# Patient Record
Sex: MALE | Race: WHITE | NOT HISPANIC OR LATINO | Employment: FULL TIME | URBAN - METROPOLITAN AREA
[De-identification: names, ages, dates, MRNs, and addresses within clinical notes are randomized per-mention and may not be internally consistent; named-entity substitution may affect disease eponyms.]

---

## 2017-02-11 ENCOUNTER — ALLSCRIPTS OFFICE VISIT (OUTPATIENT)
Dept: OTHER | Facility: OTHER | Age: 22
End: 2017-02-11

## 2017-04-01 ENCOUNTER — ALLSCRIPTS OFFICE VISIT (OUTPATIENT)
Dept: OTHER | Facility: OTHER | Age: 22
End: 2017-04-01

## 2017-07-15 ENCOUNTER — ALLSCRIPTS OFFICE VISIT (OUTPATIENT)
Dept: OTHER | Facility: OTHER | Age: 22
End: 2017-07-15

## 2017-07-19 LAB — CULTURE RESULT (HISTORICAL): NORMAL

## 2017-07-20 ENCOUNTER — GENERIC CONVERSION - ENCOUNTER (OUTPATIENT)
Dept: OTHER | Facility: OTHER | Age: 22
End: 2017-07-20

## 2017-08-09 ENCOUNTER — GENERIC CONVERSION - ENCOUNTER (OUTPATIENT)
Dept: OTHER | Facility: OTHER | Age: 22
End: 2017-08-09

## 2017-08-10 ENCOUNTER — GENERIC CONVERSION - ENCOUNTER (OUTPATIENT)
Dept: OTHER | Facility: OTHER | Age: 22
End: 2017-08-10

## 2017-08-10 LAB
BUN SERPL-MCNC: 15 MG/DL (ref 6–20)
BUN/CREA RATIO (HISTORICAL): 14 (ref 9–20)
CALCIUM SERPL-MCNC: 9.3 MG/DL (ref 8.7–10.2)
CHLORIDE SERPL-SCNC: 99 MMOL/L (ref 96–106)
CO2 SERPL-SCNC: 26 MMOL/L (ref 18–29)
CREAT SERPL-MCNC: 1.09 MG/DL (ref 0.76–1.27)
EGFR AFRICAN AMERICAN (HISTORICAL): 111 ML/MIN/1.73
EGFR-AMERICAN CALC (HISTORICAL): 96 ML/MIN/1.73
ERYTHROCYTE SEDIMENTATION RATE (HISTORICAL): 2 MM/HR (ref 0–15)
GLUCOSE SERPL-MCNC: 91 MG/DL (ref 65–99)
POTASSIUM SERPL-SCNC: 4.5 MMOL/L (ref 3.5–5.2)
SODIUM SERPL-SCNC: 141 MMOL/L (ref 134–144)

## 2017-08-11 LAB — LYME IGG/IGM AB (HISTORICAL): <0.91 ISR (ref 0–0.9)

## 2017-08-12 ENCOUNTER — GENERIC CONVERSION - ENCOUNTER (OUTPATIENT)
Dept: OTHER | Facility: OTHER | Age: 22
End: 2017-08-12

## 2017-08-14 ENCOUNTER — GENERIC CONVERSION - ENCOUNTER (OUTPATIENT)
Dept: OTHER | Facility: OTHER | Age: 22
End: 2017-08-14

## 2017-08-15 ENCOUNTER — GENERIC CONVERSION - ENCOUNTER (OUTPATIENT)
Dept: OTHER | Facility: OTHER | Age: 22
End: 2017-08-15

## 2018-01-09 NOTE — RESULT NOTES
Verified Results  (1) BASIC METABOLIC PROFILE [id number] 08:06AM Tamia Mew     Test Name Result Flag Reference   Glucose, Serum 91 mg/dL  65-99   BUN 15 mg/dL  6-20   Creatinine, Serum 1 09 mg/dL  0 76-1 27   BUN/Creatinine Ratio 14  9-20   Sodium, Serum 141 mmol/L  134-144   Potassium, Serum 4 5 mmol/L  3 5-5 2   Chloride, Serum 99 mmol/L     Carbon Dioxide, Total 26 mmol/L  18-29   Calcium, Serum 9 3 mg/dL  8 7-10 2   eGFR If NonAfricn Am 96 mL/min/1 73  >59   eGFR If Africn Am 111 mL/min/1 73  >59

## 2018-01-13 VITALS
WEIGHT: 170 LBS | SYSTOLIC BLOOD PRESSURE: 118 MMHG | HEIGHT: 70 IN | BODY MASS INDEX: 24.34 KG/M2 | DIASTOLIC BLOOD PRESSURE: 74 MMHG | TEMPERATURE: 99.7 F

## 2018-01-14 VITALS
HEIGHT: 70 IN | WEIGHT: 170 LBS | SYSTOLIC BLOOD PRESSURE: 118 MMHG | DIASTOLIC BLOOD PRESSURE: 74 MMHG | BODY MASS INDEX: 24.34 KG/M2

## 2018-01-14 VITALS
HEIGHT: 70 IN | WEIGHT: 170 LBS | DIASTOLIC BLOOD PRESSURE: 64 MMHG | SYSTOLIC BLOOD PRESSURE: 108 MMHG | BODY MASS INDEX: 24.34 KG/M2

## 2018-01-16 NOTE — RESULT NOTES
Message   Recorded as Task   Date: 08/14/2017 05:14 PM, Created By: Juan Christensen   Task Name: Call Patient with results   Assigned To:  Johnnie Thao   Regarding Patient: María Hurst, Status: Active   CommentRaelene Morris - 14 Aug 2017 5:14 PM     Patient Phone: [phone]    this result is normal   Shereen Chong - 15 Aug 2017 8:17 AM     TASK EDITED  pt aware results        Signatures   Electronically signed by : Monique Ruby, ; Aug 15 2017  8:18AM EST                       (Author)

## 2018-01-16 NOTE — RESULT NOTES
Verified Results  (1) THROAT CULTURE (CULTURE, UPPER RESPIRATORY) [id number] 12:00AM [facility]     Test Name Result Flag Reference   CULTURE, THROAT      CULTURE, THROAT         MICRO NUMBER:      [id number]    TEST STATUS:       FINAL    SPECIMEN SOURCE:   FULL THROAT CX    SPECIMEN QUALITY:  ADEQUATE    RESULT:            No oropharyngeal pathogens recovered

## 2018-01-17 NOTE — PROGRESS NOTES
Assessment    1  Encounter for preventive health examination (V70 0) (Z00 00)   2  Dental infection (522 4) (K04 7)    Plan  Health Maintenance    · (1) BASIC METABOLIC PROFILE; Status:Active; Requested for:21Nov2016;    · (1) LIPID PANEL FASTING W DIRECT LDL REFLEX; Status:Active; Requested  for:21Nov2016;     Discussion/Summary  Impression: health maintenance visit  Currently, he eats a healthy diet and has an adequate exercise regimen  Prostate cancer screening: PSA is not indicated  Testicular cancer screening: the risks and benefits of testicular cancer screening were discussed and self testicular exam technique was taught  Colorectal cancer screening: colorectal cancer screening is not indicated  Screening lab work includes ordered  The risks and benefits of immunizations were discussed  He was advised to be evaluated by a dentist  Advice and education were given regarding nutrition, aerobic exercise and seat belt use  Patient discussion: discussed with the patient  Get dental exam-possible unroofed wisdom- If dentist feels needs oral surgeon  Check CMP, lipid panel  Possible side effects of new medications were reviewed with the patient/guardian today  Chief Complaint  ANNUAL PE    TROUBLE WITH WISDOM TEETH    History of Present Illness  HM, Adult Male: The patient is being seen for a health maintenance evaluation  The last health maintenance visit was >5 year(s) ago  Social History: Household members include father  He is unmarried  Work status: working part-time  The patient has never smoked cigarettes  He reports frequent alcohol use and drinking 1 drinks per day  The patient has no concerns about alcohol abuse  He has never used illicit drugs  General Health: The patient's health since the last visit is described as fair  He does not have regular dental visits  He denies vision problems  He denies hearing loss  Lifestyle:  He does not have a healthy diet   He does not have any weight concerns  He exercises regularly  He does not use tobacco    Screening: cancer screening reviewed and current  metabolic screening reviewed and updated  Additional History:  FH- father HTN    HPI: 1) dental pain and swelling of gums, dental sensitivity  2) no metabolic screening to date      Review of Systems    Constitutional: no fever, not feeling poorly, no chills and not feeling tired  Cardiovascular: the heart rate was not slow, no chest pain, the heart rate was not fast and no palpitations  Respiratory: no shortness of breath, no cough, no wheezing and no shortness of breath during exertion  Gastrointestinal: no abdominal pain, no nausea and no diarrhea  Genitourinary: no dysuria, no urinary hesitancy and no nocturia  Musculoskeletal: no arthralgias, no joint swelling, no myalgias and no joint stiffness  Psychiatric: no anxiety, no personality change, no depression and no emotional problems  Active Problems    1  Acute bronchitis, unspecified organism (466 0) (J20 9)    Family History  Mother    · Family history of High cholesterol  Father    · Family history of High cholesterol    Social History    · Current some day smoker (305 1) (F17 210)    Current Meds   1  No Reported Medications Recorded    Allergies    1  Amoxicillin CAPS    Vitals   Recorded: 21Nov2016 03:07PM   Heart Rate 80   Respiration 16   Systolic 314   Diastolic 70   Height 5 ft 10 in   Weight 156 lb    BMI Calculated 22 38   BSA Calculated 1 88   O2 Saturation 95     Physical Exam    Constitutional   General appearance: No acute distress, well appearing and well nourished  Head and Face   Head and face: Normal     Palpation of the face and sinuses: No sinus tenderness  Eyes   Conjunctiva and lids: No erythema, swelling or discharge  Pupils and irises: Equal, round, reactive to light  Ophthalmoscopic examination: Normal fundi and optic discs      Ears, Nose, Mouth, and Throat   External inspection of ears and nose: Normal     Otoscopic examination: Tympanic membranes translucent with normal light reflex  Canals patent without erythema  Nasal mucosa, septum, and turbinates: Normal without edema or erythema  Lips, teeth, and gums: Abnormal   Swelling posterior molars both sides, left greater than right  Some tenderness of the gums  Oropharynx: Normal with no erythema, edema, exudate or lesions  Neck   Neck: Supple, symmetric, trachea midline, no masses  Thyroid: Normal, no thyromegaly  Pulmonary   Respiratory effort: No increased work of breathing or signs of respiratory distress  Auscultation of lungs: Clear to auscultation  no rales or crackles were heard bilaterally  no rhonchi  no wheezing  Cardiovascular   Auscultation of heart: Normal rate and rhythm, normal S1 and S2, no murmurs  Carotid pulses: 2+ bilaterally  Pedal pulses: 2+ bilaterally  Examination of extremities for edema and/or varicosities: Normal     Abdomen   Abdomen: Non-tender, no masses  The abdomen was flat  Bowel sounds were normal  The abdomen was soft and nontender  The abdomen was normal to percussion  Musculoskeletal   Gait and station: Normal     Inspection/palpation of digits and nails: Normal without clubbing or cyanosis  Inspection/palpation of joints, bones, and muscles: Normal     Range of motion: Normal     Muscle strength/tone: Normal     Skin   Skin and subcutaneous tissue: Normal without rashes or lesions  Neurologic   Cranial nerves: Cranial nerves 2-12 intact  Psychiatric   Orientation to person, place and time: Normal     Mood and affect: Normal        Results/Data  PHQ-2 Adult Depression Screening 21Nov2016 03:48PM User, [employer]     Test Name Result Flag Reference   PHQ-2 Adult Depression Score 0     Over the last two weeks, how often have you been bothered by any of the following problems?   Little interest or pleasure in doing things: Not at all - 0  Feeling down, depressed, or hopeless: Not at all - 0   PHQ-2 Adult Depression Screening Negative         Signatures   Electronically signed by : Lanette Fong MD; Nov 21 2016  4:54PM EST                       (Author)

## 2018-01-18 NOTE — RESULT NOTES
Verified Results  (LC) Lyme, Total Ab Test/Reflex [id number] 08:09AM [email or web address]     Test Name Result Flag Reference   Lyme IgG/IgM Ab <0 91 ISR  0 00-0 90   Negative         <0 91                                                 Equivocal  0 91 - 1 09                                                 Positive         >1 09                       (LC) Sedimentation Rate-Westergren [id number] 08:09AM [email or web address]     Test Name Result Flag Reference   Sedimentation Rate-Westergren 2 mm/hr  0-15

## 2020-01-06 ENCOUNTER — OFFICE VISIT (OUTPATIENT)
Dept: FAMILY MEDICINE CLINIC | Facility: CLINIC | Age: 25
End: 2020-01-06
Payer: COMMERCIAL

## 2020-01-06 VITALS
HEART RATE: 70 BPM | OXYGEN SATURATION: 97 % | TEMPERATURE: 97.2 F | WEIGHT: 175.71 LBS | DIASTOLIC BLOOD PRESSURE: 80 MMHG | BODY MASS INDEX: 25.15 KG/M2 | HEIGHT: 70 IN | SYSTOLIC BLOOD PRESSURE: 122 MMHG

## 2020-01-06 DIAGNOSIS — R05.9 COUGH: ICD-10-CM

## 2020-01-06 DIAGNOSIS — J21.9 ACUTE BRONCHIOLITIS DUE TO UNSPECIFIED ORGANISM: Primary | ICD-10-CM

## 2020-01-06 PROCEDURE — 3008F BODY MASS INDEX DOCD: CPT | Performed by: NURSE PRACTITIONER

## 2020-01-06 PROCEDURE — 99213 OFFICE O/P EST LOW 20 MIN: CPT | Performed by: NURSE PRACTITIONER

## 2020-01-06 PROCEDURE — 1036F TOBACCO NON-USER: CPT | Performed by: NURSE PRACTITIONER

## 2020-01-06 RX ORDER — GUAIFENESIN AND CODEINE PHOSPHATE 100; 10 MG/5ML; MG/5ML
5 SOLUTION ORAL 3 TIMES DAILY PRN
Qty: 120 ML | Refills: 0 | Status: SHIPPED | OUTPATIENT
Start: 2020-01-06 | End: 2022-07-06

## 2020-01-06 RX ORDER — AZITHROMYCIN 250 MG/1
TABLET, FILM COATED ORAL
Qty: 6 TABLET | Refills: 0 | Status: CANCELLED | OUTPATIENT
Start: 2020-01-06 | End: 2020-01-11

## 2020-01-06 RX ORDER — AZITHROMYCIN 250 MG/1
TABLET, FILM COATED ORAL
Qty: 6 TABLET | Refills: 0 | Status: SHIPPED | OUTPATIENT
Start: 2020-01-06 | End: 2020-01-10

## 2020-01-06 NOTE — PROGRESS NOTES
BMI Counseling: Body mass index is 25 21 kg/m²  The BMI is above normal  Nutrition recommendations include decreasing portion sizes and consuming healthier snacks  Exercise recommendations include exercising 3-5 times per week  [address]  [facility]        NAME: Brett Damico is a 25 y o  male  : 1995    MRN: [id number]  DATE: 2020  TIME: 3:24 PM    Assessment and Plan   Acute bronchiolitis due to unspecified organism [J21 9]  1  Acute bronchiolitis due to unspecified organism  azithromycin (ZITHROMAX) 250 mg tablet   2  Cough  guaifenesin-codeine (GUAIFENESIN AC) 100-10 MG/5ML liquid         Patient Instructions     Patient Instructions   Discussed viral vs bacterial infection  RX as ordered  Continue OTC cough/cold medications as directed  Call or return for problems/concerns            Chief Complaint     Chief Complaint   Patient presents with    Cough     past 5 weeks         History of Present Illness       Cough x 5 weeks  Productive at times  Other symptoms include sore throat, chest tightness and congestion  No fever  Taking OTC cold medications with temporary relief  Review of Systems   Review of Systems   Constitutional: Negative for activity change, chills, fatigue and fever  HENT: Positive for congestion, postnasal drip, rhinorrhea and sore throat  Negative for ear pain and sinus pressure  Eyes: Negative for pain, discharge and redness  Respiratory: Positive for cough and chest tightness  Negative for wheezing  Cardiovascular: Negative for chest pain  Gastrointestinal: Negative for constipation, diarrhea, nausea and vomiting  Musculoskeletal: Negative for myalgias  Skin: Negative for rash  Neurological: Positive for headaches  Negative for dizziness           Current Medications       Current Outpatient Medications:     azithromycin (ZITHROMAX) 250 mg tablet, Take 2 tablets today then 1 tablet daily x 4 days, Disp: 6 tablet, Rfl: 0   guaifenesin-codeine (GUAIFENESIN AC) 100-10 MG/5ML liquid, Take 5 mL by mouth 3 (three) times a day as needed for cough, Disp: 120 mL, Rfl: 0    Current Allergies     Allergies as of 01/06/2020 - Reviewed 01/06/2020   Allergen Reaction Noted    Amoxicillin  06/09/2016            The following portions of the patient's history were reviewed and updated as appropriate: allergies, current medications, past family history, past medical history, past social history, past surgical history and problem list      History reviewed  No pertinent past medical history  Past Surgical History:   Procedure Laterality Date    WISDOM TOOTH EXTRACTION         History reviewed  No pertinent family history  Medications have been verified  Objective   /80 (BP Location: Left arm, Patient Position: Sitting, Cuff Size: Extra-Large)   Pulse 70   Temp (!) 97 2 °F (36 2 °C) (Tympanic)   Ht 5' 10" (1 778 m)   Wt 79 7 kg (175 lb 11 3 oz)   SpO2 97%   BMI 25 21 kg/m²        Physical Exam     Physical Exam   Constitutional: He is oriented to person, place, and time  He appears well-developed and well-nourished  HENT:   Head: Normocephalic  Nose: Rhinorrhea present  Mouth/Throat: Posterior oropharyngeal erythema present  Eyes: Pupils are equal, round, and reactive to light  Conjunctivae and EOM are normal    Neck: Normal range of motion  Neck supple  No thyromegaly present  Cardiovascular: Normal rate, regular rhythm and normal heart sounds  Pulmonary/Chest: Effort normal and breath sounds normal  No respiratory distress  He has no wheezes  He has no rales  Cough present   Lymphadenopathy:     He has no cervical adenopathy  Neurological: He is alert and oriented to person, place, and time  Skin: Skin is warm and dry  Psychiatric: He has a normal mood and affect  His behavior is normal    Vitals reviewed

## 2020-03-25 ENCOUNTER — OFFICE VISIT (OUTPATIENT)
Dept: FAMILY MEDICINE CLINIC | Facility: CLINIC | Age: 25
End: 2020-03-25
Payer: COMMERCIAL

## 2020-03-25 VITALS
BODY MASS INDEX: 25.77 KG/M2 | HEART RATE: 76 BPM | SYSTOLIC BLOOD PRESSURE: 122 MMHG | DIASTOLIC BLOOD PRESSURE: 70 MMHG | HEIGHT: 70 IN | WEIGHT: 180 LBS

## 2020-03-25 DIAGNOSIS — L40.9 PSORIASIS: Primary | ICD-10-CM

## 2020-03-25 PROCEDURE — 99213 OFFICE O/P EST LOW 20 MIN: CPT | Performed by: NURSE PRACTITIONER

## 2020-03-25 PROCEDURE — 3008F BODY MASS INDEX DOCD: CPT | Performed by: NURSE PRACTITIONER

## 2020-03-25 PROCEDURE — 1036F TOBACCO NON-USER: CPT | Performed by: NURSE PRACTITIONER

## 2020-03-25 NOTE — PROGRESS NOTES
[address]  [facility]        NAME: Angela Cleaning is a 25 y o  male  : 1995    MRN: [id number]  DATE: 2020  TIME: 1:07 PM    Assessment and Plan   Psoriasis [L40 9]  1  Psoriasis  Ambulatory referral to Dermatology         Patient Instructions     Patient Instructions   Moisturize skin as discussed  F/up with dermatology-referral given    Psoriasis   WHAT YOU NEED TO KNOW:   What is psoriasis? Psoriasis is a long-term skin disease in which the skin cells grow faster than normal  This abnormal growth causes a buildup of cells on the surface of the skin  Red, raised patches that are covered with silver-colored scales form on your skin  What causes psoriasis? The exact cause of psoriasis is not known  A problem with the immune system sometimes causes your body to attack healthy skin cells  Psoriasis is more likely to occur if another family member also has psoriasis  Flare-ups of psoriasis come and go and are often caused by certain triggers  · Infections:  Germs, such as bacteria, viruses, or fungi, may trigger a flare-up  A flare-up of psoriasis usually follows a sore throat  · Medicines:  Certain medicines, such as those used to treat high blood pressure or depression, may trigger a psoriasis flare-up  · Skin damage:  Skin injuries, such as a cut or scrape, or a sunburn may increase your risk of a flare-up  · Smoking and alcohol:  Smoking and drinking alcohol may also increase your risk  · Stress: Both physical and emotional stress may lead to a flare-up of psoriasis  What are the signs and symptoms of psoriasis? The signs and symptoms usually depend on the type of psoriasis you have  · Plaque type: This is the most common and mildest type of psoriasis  Plaques are reddened patches covered with silver-colored scales  Your knees, elbows, scalp, stomach, and back are usually affected   You may also have nail changes, such as pitting, thickening, or lifting of the nails off the nail bed  · Guttate type: This type is the most common among children and young adults  It usually happens after a sore throat or other infections  This type looks like red, raised, pea-sized drops on your skin  · Inverse type: The plaques appear as smooth red patches and are often found in the moist areas of your body  It affects skin in the armpits, groin, under breasts, and around the genitals  · Erythrodermic type: This is a rare and severe type of psoriasis in which plaques cover large areas of the skin  These areas itch and are painful  · Pustular type:  Pustules (blisters with pus inside) or pimple-like lesions may appear on large red areas of the skin  Sometimes this type is limited to the palms of the hands and soles of the feet  · Psoriatic arthritis:  Some people who have psoriasis may also develop psoriatic arthritis  Psoriatic arthritis makes your joints swollen and painful  You may also have nail changes, such as pitting, thickening, or lifting of the nails off the nail bed  How is psoriasis diagnosed? Your healthcare provider will ask about your health history  He may also want to know if you have other family members who have psoriasis  Psoriasis is usually diagnosed after a careful examination of your skin, scalp, and nails  Blood tests and x-rays may also be needed  How is psoriasis treated? Treatment usually depends on the severity of the disease, size of the areas involved, and the type of psoriasis  · Topical medicine: These medicines are ointments, creams, and pastes that are applied on the skin  ¨ Moisturizers: These soothe your skin by keeping it moist and preventing dryness  ¨ Steroids: This medicine may be given to decrease inflammation  ¨ Vitamin D and retinoids: These are vitamin-based creams that are used to clear plaques  ¨ Anthralin:  This medicine decreases swelling and excess skin cells that form scales  ¨ Salicylic acid:   This peeling agent helps decrease scaling of the skin and scalp  ¨ Tar preparations: These medicines decrease itching, scaling, and inflammation  They may be shampoos, creams, or bath oils  · Oral medicine: These medicines are used to treat serious types of psoriasis and are taken by mouth  They include steroids or retinoids  They may also include medicines that decrease the rate of growth of your skin cells or that affect your immune system  · Phototherapy:  You may need ultraviolet (UV) light treatments if your psoriasis is severe  Your skin is exposed to UV light for the period of time that your healthcare provider prescribes  What are the risks of psoriasis? Certain medicines used to treat psoriasis can cause burning, redness, and irritation of your skin  They can also cause drowsiness, high blood pressure, or birth defects  Without treatment, your signs and symptoms may worsen  Psoriasis may cause severe itching, swelling, and infection  You may also bleed more easily  How can I manage my psoriasis? · Take care of your skin:  Apply emollients, lubricants, or moisturizing creams to your skin regularly  Apply these while your skin is still damp when you bathe  Stop using them if they sting or irritate your skin  Use mild soaps and add bath oils to soothe your skin when you bathe  · Protect your skin from sun exposure:  When you get sun exposure for short periods of time, it can help your psoriasis  Too much sun exposure or a sunburn can make your psoriasis worse  Talk to your dermatologist or healthcare provider about how much sun exposure is right for you  · Manage stress:  Stress can trigger a flare-up  Find healthy ways to manage stress, such as deep breathing or meditation  · Watch for symptoms with new medicines or herbal supplements:  Some medicines, including herbal supplements, may trigger a psoriasis flare-up  Ask if any of the medicines you take may be making your psoriasis worse  Always check for skin changes when you take your medicines  · Do not smoke:  Smoking can trigger a flare-up of psoriasis  If you smoke, it is never too late to quit  Ask for information about how to stop  · Avoid triggers:  Injury to the skin, cold weather, and heavy alcohol use are other things that can trigger psoriasis flare-ups  When should I contact my healthcare provider? · You get pregnant  · You have a fever  · Your skin plaques are not getting better or are getting worse  · You cannot sleep because your skin itches  · Your skin plaques have pus draining from them or they have soft yellow scabs  · You have questions or concerns about your condition or care  When should I seek immediate care? · Psoriasis suddenly covers larger areas of your body and becomes more painful  CARE AGREEMENT:   You have the right to help plan your care  Learn about your health condition and how it may be treated  Discuss treatment options with your caregivers to decide what care you want to receive  You always have the right to refuse treatment  The above information is an  only  It is not intended as medical advice for individual conditions or treatments  Talk to your doctor, nurse or pharmacist before following any medical regimen to see if it is safe and effective for you  © 2017 [address]  Information is for End User's use only and may not be sold, redistributed or otherwise used for commercial purposes  All illustrations and images included in CareNotes® are the copyrighted property of [employer]  or [employer]  Chief Complaint     Chief Complaint   Patient presents with    Rash     left upper bicep X 2 weeks         History of Present Illness       Itchy dry rash on right upper arm x 2 weeks   Patient though it wasring worm and       Review of Systems   Review of Systems      Current Medications       Current Outpatient Medications:    guaifenesin-codeine (GUAIFENESIN AC) 100-10 MG/5ML liquid, Take 5 mL by mouth 3 (three) times a day as needed for cough (Patient not taking: Reported on 3/25/2020), Disp: 120 mL, Rfl: 0    Current Allergies     Allergies as of 03/25/2020 - Reviewed 03/25/2020   Allergen Reaction Noted    Amoxicillin  06/09/2016            The following portions of the patient's history were reviewed and updated as appropriate: allergies, current medications, past family history, past medical history, past social history, past surgical history and problem list      History reviewed  No pertinent past medical history  Past Surgical History:   Procedure Laterality Date    WISDOM TOOTH EXTRACTION         Family History   Problem Relation Age of Onset    Hyperlipidemia Mother     Hyperlipidemia Father          Medications have been verified  Objective   /70   Pulse 76   Ht 5' 10" (1 778 m)   Wt 81 6 kg (180 lb)   BMI 25 83 kg/m²        Physical Exam     Physical Exam   Constitutional: He is oriented to person, place, and time  He appears well-developed and well-nourished  No distress  Cardiovascular: Normal rate, regular rhythm and normal heart sounds  No murmur heard  Pulmonary/Chest: Effort normal and breath sounds normal  No respiratory distress  He has no wheezes  Musculoskeletal: Normal range of motion  Neurological: He is alert and oriented to person, place, and time  Skin: Skin is warm and dry  Rash (patchy, scaley, erythematous rash located on left upper arm) noted  He is not diaphoretic  Psychiatric: He has a normal mood and affect  His behavior is normal  Judgment and thought content normal    Nursing note and vitals reviewed

## 2020-03-25 NOTE — PATIENT INSTRUCTIONS
Moisturize skin as discussed  F/up with dermatology-referral given    Psoriasis   WHAT YOU NEED TO KNOW:   What is psoriasis? Psoriasis is a long-term skin disease in which the skin cells grow faster than normal  This abnormal growth causes a buildup of cells on the surface of the skin  Red, raised patches that are covered with silver-colored scales form on your skin  What causes psoriasis? The exact cause of psoriasis is not known  A problem with the immune system sometimes causes your body to attack healthy skin cells  Psoriasis is more likely to occur if another family member also has psoriasis  Flare-ups of psoriasis come and go and are often caused by certain triggers  · Infections:  Germs, such as bacteria, viruses, or fungi, may trigger a flare-up  A flare-up of psoriasis usually follows a sore throat  · Medicines:  Certain medicines, such as those used to treat high blood pressure or depression, may trigger a psoriasis flare-up  · Skin damage:  Skin injuries, such as a cut or scrape, or a sunburn may increase your risk of a flare-up  · Smoking and alcohol:  Smoking and drinking alcohol may also increase your risk  · Stress: Both physical and emotional stress may lead to a flare-up of psoriasis  What are the signs and symptoms of psoriasis? The signs and symptoms usually depend on the type of psoriasis you have  · Plaque type: This is the most common and mildest type of psoriasis  Plaques are reddened patches covered with silver-colored scales  Your knees, elbows, scalp, stomach, and back are usually affected  You may also have nail changes, such as pitting, thickening, or lifting of the nails off the nail bed  · Guttate type: This type is the most common among children and young adults  It usually happens after a sore throat or other infections  This type looks like red, raised, pea-sized drops on your skin  · Inverse type:   The plaques appear as smooth red patches and are often found in the moist areas of your body  It affects skin in the armpits, groin, under breasts, and around the genitals  · Erythrodermic type: This is a rare and severe type of psoriasis in which plaques cover large areas of the skin  These areas itch and are painful  · Pustular type:  Pustules (blisters with pus inside) or pimple-like lesions may appear on large red areas of the skin  Sometimes this type is limited to the palms of the hands and soles of the feet  · Psoriatic arthritis:  Some people who have psoriasis may also develop psoriatic arthritis  Psoriatic arthritis makes your joints swollen and painful  You may also have nail changes, such as pitting, thickening, or lifting of the nails off the nail bed  How is psoriasis diagnosed? Your healthcare provider will ask about your health history  He may also want to know if you have other family members who have psoriasis  Psoriasis is usually diagnosed after a careful examination of your skin, scalp, and nails  Blood tests and x-rays may also be needed  How is psoriasis treated? Treatment usually depends on the severity of the disease, size of the areas involved, and the type of psoriasis  · Topical medicine: These medicines are ointments, creams, and pastes that are applied on the skin  ¨ Moisturizers: These soothe your skin by keeping it moist and preventing dryness  ¨ Steroids: This medicine may be given to decrease inflammation  ¨ Vitamin D and retinoids: These are vitamin-based creams that are used to clear plaques  ¨ Anthralin:  This medicine decreases swelling and excess skin cells that form scales  ¨ Salicylic acid: This peeling agent helps decrease scaling of the skin and scalp  ¨ Tar preparations: These medicines decrease itching, scaling, and inflammation  They may be shampoos, creams, or bath oils  · Oral medicine: These medicines are used to treat serious types of psoriasis and are taken by mouth   They include steroids or retinoids  They may also include medicines that decrease the rate of growth of your skin cells or that affect your immune system  · Phototherapy:  You may need ultraviolet (UV) light treatments if your psoriasis is severe  Your skin is exposed to UV light for the period of time that your healthcare provider prescribes  What are the risks of psoriasis? Certain medicines used to treat psoriasis can cause burning, redness, and irritation of your skin  They can also cause drowsiness, high blood pressure, or birth defects  Without treatment, your signs and symptoms may worsen  Psoriasis may cause severe itching, swelling, and infection  You may also bleed more easily  How can I manage my psoriasis? · Take care of your skin:  Apply emollients, lubricants, or moisturizing creams to your skin regularly  Apply these while your skin is still damp when you bathe  Stop using them if they sting or irritate your skin  Use mild soaps and add bath oils to soothe your skin when you bathe  · Protect your skin from sun exposure:  When you get sun exposure for short periods of time, it can help your psoriasis  Too much sun exposure or a sunburn can make your psoriasis worse  Talk to your dermatologist or healthcare provider about how much sun exposure is right for you  · Manage stress:  Stress can trigger a flare-up  Find healthy ways to manage stress, such as deep breathing or meditation  · Watch for symptoms with new medicines or herbal supplements:  Some medicines, including herbal supplements, may trigger a psoriasis flare-up  Ask if any of the medicines you take may be making your psoriasis worse  Always check for skin changes when you take your medicines  · Do not smoke:  Smoking can trigger a flare-up of psoriasis  If you smoke, it is never too late to quit  Ask for information about how to stop      · Avoid triggers:  Injury to the skin, cold weather, and heavy alcohol use are other things that can trigger psoriasis flare-ups  When should I contact my healthcare provider? · You get pregnant  · You have a fever  · Your skin plaques are not getting better or are getting worse  · You cannot sleep because your skin itches  · Your skin plaques have pus draining from them or they have soft yellow scabs  · You have questions or concerns about your condition or care  When should I seek immediate care? · Psoriasis suddenly covers larger areas of your body and becomes more painful  CARE AGREEMENT:   You have the right to help plan your care  Learn about your health condition and how it may be treated  Discuss treatment options with your caregivers to decide what care you want to receive  You always have the right to refuse treatment  The above information is an  only  It is not intended as medical advice for individual conditions or treatments  Talk to your doctor, nurse or pharmacist before following any medical regimen to see if it is safe and effective for you  © 2017 [address] Information is for End User's use only and may not be sold, redistributed or otherwise used for commercial purposes  All illustrations and images included in CareNotes® are the copyrighted property of [employer]  or [employer]

## 2020-03-31 ENCOUNTER — TELEPHONE (OUTPATIENT)
Dept: FAMILY MEDICINE CLINIC | Facility: CLINIC | Age: 25
End: 2020-03-31

## 2020-03-31 DIAGNOSIS — M25.549 ARTHRALGIA OF HAND, UNSPECIFIED LATERALITY: Primary | ICD-10-CM

## 2020-06-29 ENCOUNTER — OFFICE VISIT (OUTPATIENT)
Dept: FAMILY MEDICINE CLINIC | Facility: CLINIC | Age: 25
End: 2020-06-29
Payer: COMMERCIAL

## 2020-06-29 VITALS
OXYGEN SATURATION: 96 % | BODY MASS INDEX: 24.08 KG/M2 | SYSTOLIC BLOOD PRESSURE: 112 MMHG | DIASTOLIC BLOOD PRESSURE: 72 MMHG | HEIGHT: 71 IN | HEART RATE: 69 BPM | TEMPERATURE: 97.4 F | WEIGHT: 172 LBS

## 2020-06-29 DIAGNOSIS — M54.41 ACUTE RIGHT-SIDED LOW BACK PAIN WITH RIGHT-SIDED SCIATICA: Primary | ICD-10-CM

## 2020-06-29 PROCEDURE — 3008F BODY MASS INDEX DOCD: CPT | Performed by: NURSE PRACTITIONER

## 2020-06-29 PROCEDURE — 1036F TOBACCO NON-USER: CPT | Performed by: NURSE PRACTITIONER

## 2020-06-29 PROCEDURE — 99213 OFFICE O/P EST LOW 20 MIN: CPT | Performed by: NURSE PRACTITIONER

## 2020-06-29 RX ORDER — CYCLOBENZAPRINE HCL 10 MG
10 TABLET ORAL
Qty: 30 TABLET | Refills: 0 | Status: SHIPPED | OUTPATIENT
Start: 2020-06-29 | End: 2020-09-29 | Stop reason: SDUPTHER

## 2020-06-29 RX ORDER — PREDNISONE 20 MG/1
TABLET ORAL
Qty: 15 TABLET | Refills: 0 | Status: SHIPPED | OUTPATIENT
Start: 2020-06-29 | End: 2020-12-07

## 2020-07-19 NOTE — PROGRESS NOTES
Assessment and Plan:   Mr Leobardo Suarez is a 80-year-old male with no significant past medical history, who presents for further evaluation of left hand 5th digit swelling  He is referred by JOS Whitten for a rheumatology consult  Silvia Mari presents today for further evaluation of left hand 5th digit swelling without prominent pain, that appears as dactylitis, with only a single digit involved  He does not report any other significant joint involvement or concerns for associated spondyloarthropathy related conditions such as inflammatory bowel disease, a clear diagnosis of psoriasis or inflammatory bowel disease (and does not report features consistent with inflammatory back pain)  I do question if the skin rash he experienced on his arm in December 2019 as well as the dryness of his palms could be related to psoriasis with a resultant psoriatic arthritis causing the dactylitis  - To further evaluate I would like to complete the workup as listed below and see him back in the office in 6 weeks  If there is only a single digit involved for now, I may continue to monitor, but depending on the progression of his symptoms or additional joint involvement we will discuss if DMARD use is indicated  As the non biologic DMARDs may not be as effective for the treatment of dactylitis, we will discuss if it makes sense to start him on TNF inhibitors  Plan:  Diagnoses and all orders for this visit:    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 ([facility] [address] )  -     CBC and differential; Future  -     Comprehensive metabolic panel; Future  -     Chronic Hepatitis Panel; Future  -     C-reactive protein; Future  -     Cyclic citrul peptide antibody, IgG; Future  -     HLA-B27 antigen; Future  -     HIV 1/2 Antigen/Antibody (4th Generation) w Reflex [facility]; Future  -     RF Screen w/ Reflex to Titer; Future  -     Sedimentation rate, automated; Future  -     Uric acid;  Future  -     XR hand 3+ vw right; Future  -     XR hand 3+ vw left; Future  -     XR sacroiliac joints 3+ views; Future    Arthralgia of hand, unspecified laterality  -     CBC and differential; Future  -     Comprehensive metabolic panel; Future  -     Chronic Hepatitis Panel; Future  -     C-reactive protein; Future  -     Cyclic citrul peptide antibody, IgG; Future  -     HLA-B27 antigen; Future  -     HIV 1/2 Antigen/Antibody (4th Generation) w Reflex [facility]; Future  -     RF Screen w/ Reflex to Titer; Future  -     Sedimentation rate, automated; Future  -     Uric acid; Future  -     XR hand 3+ vw right; Future  -     XR hand 3+ vw left; Future  -     Ambulatory referral to Rheumatology    Acute right-sided low back pain with right-sided sciatica  -     XR sacroiliac joints 3+ views; Future    Psoriasis    Other orders  -     Cancel: RAMÓN Screen w/ Reflex to Titer/Pattern; Future  -     Cancel: Sjogren's Antibodies; Future      Activities as tolerated    Diet: low carb/low fat, more greens/vegetables, adequate hydration  Exercise: try to maintain a low impact exercise regimen as much as possible  Walk for 30 minutes a day for at least 3 days a week    Encouraged to maintain good sleep hygiene  Continue other medications as prescribed by PCP and other specialists        RTC in 6 weeks  HPI  Mr Devorah Fuentes is a 20-year-old male with no significant past medical history, who presents for further evaluation of left hand 5th digit swelling  He is referred by JOS Diaz for a rheumatology consult  Patient reports in December 2019 he had an occurrence of a circular, scaly and erythematous skin lesion on the inner aspect of his left upper arm which was thought to be consistent with psoriasis  He states it took about 1 month to resolve  He initially thought it was a fungal infection and applied antifungals without any relief  He denies any prior history of skin rashes or any skin rashes since December    Since his childhood he has had mild dryness and flakiness on the palms of his hands which has been stable  In January 2020 he noticed spontaneous and sudden onset of diffuse swelling affecting his left hand 5th digit without significant pain, but with slight stiffness while performing full range of motion  The symptoms have slightly progressed since then  He denies any significant pain of this joint  He denies any other joint pains, swelling or morning stiffness  He has not taken any over-the-counter pain medications for these symptoms  On a few occasions he has had episodic back pain, with a severe episode occurring 2 weeks ago  The pain was radiating down his right leg and it was thought to be related to a muscle sprain  He was seen by his primary care physician and prescribed a course of prednisone and Flexeril which significantly helped him  He states that the steroids did not have any impact on the left hand 5th digit swelling  He denies chronic low back pain and states that when it does occur it is usually aggravated with activities and he does obtain some relief with resting  He denies fevers, unintentional weight loss, inflammatory eye disease, other skin rashes, mouth/nose ulcers, swollen glands, chest pain, shortness of breath, inflammatory bowel disease or family history of autoimmune disease  He reports multiple family members have the same dryness on their palms but this has never been diagnosed as psoriasis  He denies any infections prior to the onset of his symptoms in January  The following portions of the patient's history were reviewed and updated as appropriate: allergies, current medications, past family history, past medical history, past social history, past surgical history and problem list       Review of Systems  Constitutional: Negative for weight change, fevers, chills, night sweats, fatigue    ENT/Mouth: Negative for hearing changes, ear pain, nasal congestion, sinus pain, hoarseness, sore throat, rhinorrhea, swallowing difficulty  Eyes: Negative for pain, redness, discharge, vision changes  Cardiovascular: Negative for chest pain, SOB, palpitations  Respiratory: Negative for cough, sputum, wheezing, dyspnea  Gastrointestinal: Negative for nausea, vomiting, diarrhea, constipation, pain, heartburn  Genitourinary: Negative for dysuria, urinary frequency, hematuria  Musculoskeletal: As per HPI  Skin: Negative for skin rash, color changes  Neuro: Negative for weakness, numbness, tingling, loss of consciousness  Psych: Negative for anxiety, depression  Heme/Lymph: Negative for easy bruising, bleeding, lymphadenopathy  History reviewed  No pertinent past medical history        Past Surgical History:   Procedure Laterality Date    WISDOM TOOTH EXTRACTION         Social History     Socioeconomic History    Marital status: Single     Spouse name: Not on file    Number of children: Not on file    Years of education: Not on file    Highest education level: Not on file   Occupational History    Not on file   Social Needs    Financial resource strain: Not on file    Food insecurity:     Worry: Not on file     Inability: Not on file    Transportation needs:     Medical: Not on file     Non-medical: Not on file   Tobacco Use    Smoking status: Never Smoker    Smokeless tobacco: Never Used    Tobacco comment: Current some day smoker - As per [employer]   Substance and Sexual Activity    Alcohol use: Yes     Frequency: 4 or more times a week     Drinks per session: 1 or 2     Comment: 1-2 beer a day    Drug use: Not Currently    Sexual activity: Yes     Partners: Female   Lifestyle    Physical activity:     Days per week: Not on file     Minutes per session: Not on file    Stress: Not on file   Relationships    Social connections:     Talks on phone: Not on file     Gets together: Not on file     Attends [employer] service: Not on file     Active member of club or organization: Not on file Attends meetings of clubs or organizations: Not on file     Relationship status: Not on file    Intimate partner violence:     Fear of current or ex partner: Not on file     Emotionally abused: Not on file     Physically abused: Not on file     Forced sexual activity: Not on file   Other Topics Concern    Not on file   Social History Narrative    Not on file       Family History   Problem Relation Age of Onset    Hyperlipidemia Mother     Hyperlipidemia Father        Allergies   Allergen Reactions    Amoxicillin        Current Outpatient Medications:     cyclobenzaprine (FLEXERIL) 10 mg tablet, Take 1 tablet (10 mg total) by mouth daily at bedtime, Disp: 30 tablet, Rfl: 0    guaifenesin-codeine (GUAIFENESIN AC) 100-10 MG/5ML liquid, Take 5 mL by mouth 3 (three) times a day as needed for cough, Disp: 120 mL, Rfl: 0    predniSONE 20 mg tablet, Take one tablet twice a day x 5 days, then take one tablet daily x 5 days, Disp: 15 tablet, Rfl: 0      Objective:    Vitals:    07/21/20 1333   BP: 138/84   BP Location: Left arm   Patient Position: Sitting   Cuff Size: Standard   Pulse: 72   Temp: 98 7 °F (37 1 °C)   TempSrc: Tympanic   Weight: 78 kg (172 lb)   Height: 5' 10" (1 778 m)       Physical Exam  General: Well appearing, well nourished, in no distress  Oriented x 3, normal mood and affect  Ambulating without difficulty  Skin: Good turgor, no rash today, unusual bruising or prominent lesions  He does have mild dryness on his bilateral palms  Hair: Normal texture and distribution  Nails: Normal color, no deformities  No pitting noted  HEENT:  Head: Normocephalic, atraumatic  Eyes: Conjunctiva clear, sclera non-icteric, EOM intact  Neck: Supple, thyroid non-enlarged and non-tender  No lymphadenopathy  Extremities: No amputations or deformities, cyanosis, edema  Musculoskeletal:   Left hand - there is a dactylitic appearance present of his left hand 5th digit without tenderness noted    There is only mild erythema noted  The remainder of his fingers are unremarkable  The remainder of his peripheral joints are also unremarkable  No sacroiliac joint tenderness  There is no other enthesitis or dactylitis  Neurologic: Alert and oriented  No focal neurological deficits appreciated  Psychiatric: Normal mood and affect  ARMIN Tillman    Rheumatology

## 2020-07-21 ENCOUNTER — OFFICE VISIT (OUTPATIENT)
Dept: RHEUMATOLOGY | Facility: CLINIC | Age: 25
End: 2020-07-21
Payer: COMMERCIAL

## 2020-07-21 ENCOUNTER — HOSPITAL ENCOUNTER (OUTPATIENT)
Dept: RADIOLOGY | Facility: HOSPITAL | Age: 25
Discharge: HOME | End: 2020-07-21
Payer: COMMERCIAL

## 2020-07-21 VITALS
DIASTOLIC BLOOD PRESSURE: 84 MMHG | WEIGHT: 172 LBS | SYSTOLIC BLOOD PRESSURE: 138 MMHG | TEMPERATURE: 98.7 F | HEART RATE: 72 BPM | BODY MASS INDEX: 24.62 KG/M2 | HEIGHT: 70 IN

## 2020-07-21 DIAGNOSIS — M46.90 DACTYLITIS DUE TO SPONDYLOARTHRITIC DISORDER (HCC): Primary | ICD-10-CM

## 2020-07-21 DIAGNOSIS — M46.90 DACTYLITIS DUE TO SPONDYLOARTHRITIC DISORDER (HCC): ICD-10-CM

## 2020-07-21 DIAGNOSIS — M54.41 ACUTE RIGHT-SIDED LOW BACK PAIN WITH RIGHT-SIDED SCIATICA: ICD-10-CM

## 2020-07-21 DIAGNOSIS — L40.9 PSORIASIS: ICD-10-CM

## 2020-07-21 DIAGNOSIS — M25.549 ARTHRALGIA OF HAND, UNSPECIFIED LATERALITY: ICD-10-CM

## 2020-07-21 PROCEDURE — 73130 X-RAY EXAM OF HAND: CPT

## 2020-07-21 PROCEDURE — 3008F BODY MASS INDEX DOCD: CPT | Performed by: NURSE PRACTITIONER

## 2020-07-21 PROCEDURE — 72200 X-RAY EXAM SI JOINTS: CPT

## 2020-07-21 PROCEDURE — 99244 OFF/OP CNSLTJ NEW/EST MOD 40: CPT | Performed by: INTERNAL MEDICINE

## 2020-07-25 ENCOUNTER — TELEPHONE (OUTPATIENT)
Dept: RHEUMATOLOGY | Facility: CLINIC | Age: 25
End: 2020-07-25

## 2020-07-25 DIAGNOSIS — M79.89 FINGER SWELLING: ICD-10-CM

## 2020-07-25 DIAGNOSIS — M79.645 PAIN IN FINGER OF LEFT HAND: Primary | ICD-10-CM

## 2020-07-26 NOTE — TELEPHONE ENCOUNTER
Please let patient know the XR hand showed concerns for a foreign body of the pinky finger - which could be causing the swelling - to find out if that's what it is vs  Joint inflammation I would like to get an MRI hand - please have him call central scheduling to schedule it  Please check if prior Ottis Bosworth is required  Thanks

## 2020-08-01 LAB
EXTERNAL HIV CONFIRMATION: NORMAL
EXTERNAL HIV SCREEN: NORMAL
HCV AB SER-ACNC: NON REACTIVE

## 2020-08-04 ENCOUNTER — HOSPITAL ENCOUNTER (OUTPATIENT)
Dept: MRI IMAGING | Facility: HOSPITAL | Age: 25
Discharge: HOME | End: 2020-08-04
Attending: INTERNAL MEDICINE
Payer: COMMERCIAL

## 2020-08-04 DIAGNOSIS — M79.645 PAIN IN FINGER OF LEFT HAND: ICD-10-CM

## 2020-08-04 DIAGNOSIS — M79.89 FINGER SWELLING: ICD-10-CM

## 2020-08-04 PROCEDURE — A9585 GADOBUTROL INJECTION: HCPCS | Performed by: INTERNAL MEDICINE

## 2020-08-04 PROCEDURE — 73220 MRI UPPR EXTREMITY W/O&W/DYE: CPT

## 2020-08-04 RX ADMIN — GADOBUTROL 7 ML: 604.72 INJECTION INTRAVENOUS at 17:54

## 2020-08-10 ENCOUNTER — OFFICE VISIT (OUTPATIENT)
Dept: FAMILY MEDICINE CLINIC | Facility: CLINIC | Age: 25
End: 2020-08-10
Payer: COMMERCIAL

## 2020-08-10 VITALS
SYSTOLIC BLOOD PRESSURE: 124 MMHG | DIASTOLIC BLOOD PRESSURE: 70 MMHG | HEART RATE: 95 BPM | OXYGEN SATURATION: 96 % | TEMPERATURE: 96.7 F

## 2020-08-10 DIAGNOSIS — A63.0 GENITAL WARTS: Primary | ICD-10-CM

## 2020-08-10 PROCEDURE — 3725F SCREEN DEPRESSION PERFORMED: CPT | Performed by: NURSE PRACTITIONER

## 2020-08-10 PROCEDURE — 99213 OFFICE O/P EST LOW 20 MIN: CPT | Performed by: NURSE PRACTITIONER

## 2020-08-10 PROCEDURE — 1036F TOBACCO NON-USER: CPT | Performed by: NURSE PRACTITIONER

## 2020-08-10 PROCEDURE — 17110 DESTRUCTION B9 LES UP TO 14: CPT | Performed by: NURSE PRACTITIONER

## 2020-08-10 NOTE — PROGRESS NOTES
[address]  [facility]        NAME: Argelia Bateman is a 22 y o  male  : 1995    MRN: [id number]  DATE: August 10, 2020  TIME: 12:29 PM    Assessment and Plan   Genital warts [A63 0]  1  Genital warts           Patient Instructions     Patient Instructions   Liquid nitrogen was applied for 10-12 seconds to the skin lesions and the expected blistering or scabbing reaction explained  Do not pick at the areas  Patient reminded to expect hypopigmented scars from the procedure  Return if lesions fail to fully resolve  Genital Warts   WHAT YOU NEED TO KNOW:   Genital warts are a sexually transmitted infection (STI) caused by the human papillomavirus (HPV)  Genital warts are growths that appear in or on the penis, vagina, or anus  Genital warts are spread during genital, anal, or oral sex  A woman can also pass them to a baby when she gives birth  DISCHARGE INSTRUCTIONS:   Contact your healthcare provider if:   · Your genital warts return  · The skin that is being treated for genital warts is very painful or swollen  · You see or feel new warts on another part of your body  · You have questions or concerns about your condition or care  Medicines:   · Immunomodulators  help strengthen your immune system and treat genital warts  · Antiproliferatives  help stop genital warts from growing in size or increasing in number  · Antivirals  help control and stop virus growth, such as HPV  · Take your medicine as directed  Contact your healthcare provider if you think your medicine is not helping or if you have side effects  Tell him or her if you are allergic to any medicine  Keep a list of the medicines, vitamins, and herbs you take  Include the amounts, and when and why you take them  Bring the list or the pill bottles to follow-up visits  Carry your medicine list with you in case of an emergency  Self-care:   · Do not touch or scratch the warts    This can cause the infection to spread to other parts of your body  · Do not have sex while you are being treated for genital warts  Medicine used on your skin weakens condoms and diaphragms  You also risk spreading genital warts to your partner  · Get regular Pap smears  If you are a woman, this can help diagnose HPV and prevent the spread of the virus  Prevent genital warts:   · Tell your sexual partners that you are being treated for genital warts  They may also be infected and need treatment  · Get the HPV vaccine  The HPV vaccine is given at 5to 32years of age to help prevent cervical cancer and genital warts  Ask your healthcare provider for more information about this vaccine  Follow up with your healthcare provider as directed:  Write down your questions so you remember to ask them during your visits  © 2017 [facility] Information is for End User's use only and may not be sold, redistributed or otherwise used for commercial purposes  All illustrations and images included in CareNotes® are the copyrighted property of A D A M , Inc  or Gorge Moira  The above information is an  only  It is not intended as medical advice for individual conditions or treatments  Talk to your doctor, nurse or pharmacist before following any medical regimen to see if it is safe and effective for you  Chief Complaint     Chief Complaint   Patient presents with    Follow-up     skin warst         History of Present Illness       Skin tag/wart on penis x 1 week  Denies pain  Denies drainage  Patient is sexually active  Had a girlfriend that did have HX of HPV  Asking to have warts removed  Review of Systems   Review of Systems   Constitutional: Negative for activity change and fever  HENT: Negative  Negative for congestion  Respiratory: Negative  Negative for cough, shortness of breath and wheezing  Cardiovascular: Negative  Genitourinary: Positive for genital sores  Negative for difficulty urinating, penile pain, penile swelling, scrotal swelling and testicular pain  Musculoskeletal: Negative  Skin: Negative for color change, rash and wound  Neurological: Negative  Psychiatric/Behavioral: Negative  Current Medications       Current Outpatient Medications:     cyclobenzaprine (FLEXERIL) 10 mg tablet, Take 1 tablet (10 mg total) by mouth daily at bedtime (Patient not taking: Reported on 8/10/2020), Disp: 30 tablet, Rfl: 0    guaifenesin-codeine (GUAIFENESIN AC) 100-10 MG/5ML liquid, Take 5 mL by mouth 3 (three) times a day as needed for cough (Patient not taking: Reported on 8/10/2020), Disp: 120 mL, Rfl: 0    predniSONE 20 mg tablet, Take one tablet twice a day x 5 days, then take one tablet daily x 5 days (Patient not taking: Reported on 8/10/2020), Disp: 15 tablet, Rfl: 0    Current Allergies     Allergies as of 08/10/2020 - Reviewed 08/10/2020   Allergen Reaction Noted    Amoxicillin  06/09/2016            The following portions of the patient's history were reviewed and updated as appropriate: allergies, current medications, past family history, past medical history, past social history, past surgical history and problem list      History reviewed  No pertinent past medical history  Past Surgical History:   Procedure Laterality Date    WISDOM TOOTH EXTRACTION         Family History   Problem Relation Age of Onset    Hyperlipidemia Mother     Hyperlipidemia Father          Medications have been verified  Objective   /70 (BP Location: Left arm, Patient Position: Sitting, Cuff Size: Standard)   Pulse 95   Temp (!) 96 7 °F (35 9 °C) (Temporal)   SpO2 96%        Physical Exam     Physical Exam  Vitals signs and nursing note reviewed  Constitutional:       General: He is not in acute distress  Appearance: He is well-developed  He is not diaphoretic  Neck:      Musculoskeletal: Normal range of motion and neck supple  Thyroid: No thyromegaly  Trachea: No tracheal deviation  Pulmonary:      Effort: Pulmonary effort is normal  No respiratory distress  Genitourinary:     Penis: Lesions present  Comments: Small cluster of 3 warts noted on left side of penis  No drainage or tenderness present  Musculoskeletal: Normal range of motion  General: No tenderness or deformity  Skin:     General: Skin is warm and dry  Neurological:      Mental Status: He is alert and oriented to person, place, and time  Psychiatric:         Speech: Speech normal          Behavior: Behavior normal          Thought Content: Thought content normal          Judgment: Judgment normal            PHQ-9 Depression Screening    PHQ-9:    Frequency of the following problems over the past two weeks:       Little interest or pleasure in doing things:  0 - not at all  Feeling down, depressed, or hopeless:  0 - not at all  PHQ-2 Score:  0         Lesion Destruction    Date/Time: 8/10/2020 12:26 PM  Performed by: JOS Dean  Authorized by: JOS Dean     Procedure Details - Lesion Destruction:     Number of Lesions:  2  Lesion 1:     Body area: Anogenital    Anogenital location:  Penis    Malignancy: benign lesion      Destruction method: cryotherapy      Extensive destruction required?: No    Lesion 2:     Body area:   Anogenital    Anogenital location:  Penis    Malignancy: benign lesion      Destruction method: cryotherapy      Extensive destruction required?: No

## 2020-08-10 NOTE — PATIENT INSTRUCTIONS
Liquid nitrogen was applied for 10-12 seconds to the skin lesions and the expected blistering or scabbing reaction explained  Do not pick at the areas  Patient reminded to expect hypopigmented scars from the procedure  Return if lesions fail to fully resolve  Genital Warts   WHAT YOU NEED TO KNOW:   Genital warts are a sexually transmitted infection (STI) caused by the human papillomavirus (HPV)  Genital warts are growths that appear in or on the penis, vagina, or anus  Genital warts are spread during genital, anal, or oral sex  A woman can also pass them to a baby when she gives birth  DISCHARGE INSTRUCTIONS:   Contact your healthcare provider if:   · Your genital warts return  · The skin that is being treated for genital warts is very painful or swollen  · You see or feel new warts on another part of your body  · You have questions or concerns about your condition or care  Medicines:   · Immunomodulators  help strengthen your immune system and treat genital warts  · Antiproliferatives  help stop genital warts from growing in size or increasing in number  · Antivirals  help control and stop virus growth, such as HPV  · Take your medicine as directed  Contact your healthcare provider if you think your medicine is not helping or if you have side effects  Tell him or her if you are allergic to any medicine  Keep a list of the medicines, vitamins, and herbs you take  Include the amounts, and when and why you take them  Bring the list or the pill bottles to follow-up visits  Carry your medicine list with you in case of an emergency  Self-care:   · Do not touch or scratch the warts  This can cause the infection to spread to other parts of your body  · Do not have sex while you are being treated for genital warts  Medicine used on your skin weakens condoms and diaphragms  You also risk spreading genital warts to your partner  · Get regular Pap smears    If you are a woman, this can help diagnose HPV and prevent the spread of the virus  Prevent genital warts:   · Tell your sexual partners that you are being treated for genital warts  They may also be infected and need treatment  · Get the HPV vaccine  The HPV vaccine is given at 5to 32years of age to help prevent cervical cancer and genital warts  Ask your healthcare provider for more information about this vaccine  Follow up with your healthcare provider as directed:  Write down your questions so you remember to ask them during your visits  © 2017 [address] Information is for End User's use only and may not be sold, redistributed or otherwise used for commercial purposes  All illustrations and images included in CareNotes® are the copyrighted property of A D A M , Inc  or Gorge Pizarro  The above information is an  only  It is not intended as medical advice for individual conditions or treatments  Talk to your doctor, nurse or pharmacist before following any medical regimen to see if it is safe and effective for you

## 2020-09-29 DIAGNOSIS — M54.41 ACUTE RIGHT-SIDED LOW BACK PAIN WITH RIGHT-SIDED SCIATICA: ICD-10-CM

## 2020-09-29 NOTE — TELEPHONE ENCOUNTER
Refill    cyclobenzaprine (FLEXERIL) 10 mg tablet    [facility]  [phone]

## 2020-09-30 RX ORDER — CYCLOBENZAPRINE HCL 10 MG
10 TABLET ORAL
Qty: 30 TABLET | Refills: 0 | Status: SHIPPED | OUTPATIENT
Start: 2020-09-30 | End: 2022-07-06

## 2020-09-30 NOTE — TELEPHONE ENCOUNTER
Pt asked if he will need to pay copy answer was yes and pt reply he cant afford a copay Please advice

## 2020-09-30 NOTE — TELEPHONE ENCOUNTER
I will send medication no refills   If his back pain continues he will need to f/up with ortho or physical therapy

## 2020-12-07 ENCOUNTER — OFFICE VISIT (OUTPATIENT)
Dept: RHEUMATOLOGY | Facility: CLINIC | Age: 25
End: 2020-12-07
Payer: COMMERCIAL

## 2020-12-07 VITALS — SYSTOLIC BLOOD PRESSURE: 143 MMHG | HEART RATE: 75 BPM | DIASTOLIC BLOOD PRESSURE: 83 MMHG

## 2020-12-07 DIAGNOSIS — M79.89 FINGER SWELLING: ICD-10-CM

## 2020-12-07 DIAGNOSIS — M79.645 PAIN IN FINGER OF LEFT HAND: Primary | ICD-10-CM

## 2020-12-07 DIAGNOSIS — R21 SKIN RASH: ICD-10-CM

## 2020-12-07 PROCEDURE — 99213 OFFICE O/P EST LOW 20 MIN: CPT | Performed by: INTERNAL MEDICINE

## 2020-12-07 PROCEDURE — 1036F TOBACCO NON-USER: CPT | Performed by: INTERNAL MEDICINE

## 2021-09-13 ENCOUNTER — OFFICE VISIT (OUTPATIENT)
Dept: FAMILY MEDICINE CLINIC | Facility: CLINIC | Age: 26
End: 2021-09-13
Payer: COMMERCIAL

## 2021-09-13 VITALS
TEMPERATURE: 97.7 F | OXYGEN SATURATION: 97 % | HEIGHT: 70 IN | DIASTOLIC BLOOD PRESSURE: 80 MMHG | HEART RATE: 70 BPM | SYSTOLIC BLOOD PRESSURE: 120 MMHG | WEIGHT: 179 LBS | BODY MASS INDEX: 25.62 KG/M2

## 2021-09-13 DIAGNOSIS — N50.811 PAIN IN RIGHT TESTICLE: ICD-10-CM

## 2021-09-13 DIAGNOSIS — R10.31 GROIN PAIN, RIGHT: ICD-10-CM

## 2021-09-13 DIAGNOSIS — Z00.00 ANNUAL PHYSICAL EXAM: Primary | ICD-10-CM

## 2021-09-13 PROCEDURE — 99395 PREV VISIT EST AGE 18-39: CPT | Performed by: NURSE PRACTITIONER

## 2021-09-13 PROCEDURE — 99213 OFFICE O/P EST LOW 20 MIN: CPT | Performed by: NURSE PRACTITIONER

## 2021-09-13 NOTE — PATIENT INSTRUCTIONS
Ultrasound of groin/testicle as ordered  Avoid heavy lifting/strenuous activity until US results are back  Call with questions/concerns  Wellness Visit for Adults   AMBULATORY CARE:   A wellness visit  is when you see your healthcare provider to get screened for health problems  Your healthcare provider will also give you advice on how to stay healthy  Write down your questions so you remember to ask them  Ask your healthcare provider how often you should have a wellness visit  What happens at a wellness visit:  Your healthcare provider will ask about your health, and your family history of health problems  This includes high blood pressure, heart disease, and cancer  He or she will ask if you have symptoms that concern you, if you smoke, and about your mood  You may also be asked about your intake of medicines, supplements, food, and alcohol  Any of the following may be done:  · Your weight  will be checked  Your height may also be checked so your body mass index (BMI) can be calculated  Your BMI shows if you are at a healthy weight  · Your blood pressure  and heart rate will be checked  Your temperature may also be checked  · Blood and urine tests  may be done  Blood tests may be done to check your cholesterol levels  Abnormal cholesterol levels increase your risk for heart disease and stroke  You may also need a blood or urine test to check for diabetes if you are at increased risk  Urine tests may be done to look for signs of an infection or kidney disease  · A physical exam  includes checking your heartbeat and lungs with a stethoscope  Your healthcare provider may also check your skin to look for sun damage  · Screening tests  may be recommended  A screening test is done to check for diseases that may not cause symptoms  The screening tests you may need depend on your age, gender, family history, and lifestyle habits   For example, colorectal screening may be recommended if you are 50 years old or older  Screening tests you need if you are a woman:   · A Pap smear  is used to screen for cervical cancer  Pap smears are usually done every 3 to 5 years depending on your age  You may need them more often if you have had abnormal Pap smear test results in the past  Ask your healthcare provider how often you should have a Pap smear  · A mammogram  is an x-ray of your breasts to screen for breast cancer  Experts recommend mammograms every 2 years starting at age 48 years  You may need a mammogram at age 52 years or younger if you have an increased risk for breast cancer  Talk to your healthcare provider about when you should start having mammograms and how often you need them  Vaccines you may need:   · Get an influenza vaccine  every year  The influenza vaccine protects you from the flu  Several types of viruses cause the flu  The viruses change over time, so new vaccines are made each year  · Get a tetanus-diphtheria (Td) booster vaccine  every 10 years  This vaccine protects you against tetanus and diphtheria  Tetanus is a severe infection that may cause painful muscle spasms and lockjaw  Diphtheria is a severe bacterial infection that causes a thick covering in the back of your mouth and throat  · Get a human papillomavirus (HPV) vaccine  if you are female and aged 23 to 32 or male 23 to 24 and never received it  This vaccine protects you from HPV infection  HPV is the most common infection spread by sexual contact  HPV may also cause vaginal, penile, and anal cancers  · Get a pneumococcal vaccine  if you are aged 72 years or older  The pneumococcal vaccine is an injection given to protect you from pneumococcal disease  Pneumococcal disease is an infection caused by pneumococcal bacteria  The infection may cause pneumonia, meningitis, or an ear infection  · Get a shingles vaccine  if you are 60 or older, even if you have had shingles before   The shingles vaccine is an injection to protect you from the varicella-zoster virus  This is the same virus that causes chickenpox  Shingles is a painful rash that develops in people who had chickenpox or have been exposed to the virus  How to eat healthy:  My Plate is a model for planning healthy meals  It shows the types and amounts of foods that should go on your plate  Fruits and vegetables make up about half of your plate, and grains and protein make up the other half  A serving of dairy is included on the side of your plate  The amount of calories and serving sizes you need depends on your age, gender, weight, and height  Examples of healthy foods are listed below:  · Eat a variety of vegetables  such as dark green, red, and orange vegetables  You can also include canned vegetables low in sodium (salt) and frozen vegetables without added butter or sauces  · Eat a variety of fresh fruits , canned fruit in 100% juice, frozen fruit, and dried fruit  · Include whole grains  At least half of the grains you eat should be whole grains  Examples include whole-wheat bread, wheat pasta, brown rice, and whole-grain cereals such as oatmeal     · Eat a variety of protein foods such as seafood (fish and shellfish), lean meat, and poultry without skin (turkey and chicken)  Examples of lean meats include pork leg, shoulder, or tenderloin, and beef round, sirloin, tenderloin, and extra lean ground beef  Other protein foods include eggs and egg substitutes, beans, peas, soy products, nuts, and seeds  · Choose low-fat dairy products such as skim or 1% milk or low-fat yogurt, cheese, and cottage cheese  · Limit unhealthy fats  such as butter, hard margarine, and shortening  Exercise:  Exercise at least 30 minutes per day on most days of the week  Some examples of exercise include walking, biking, dancing, and swimming  You can also fit in more physical activity by taking the stairs instead of the elevator or parking farther away from stores   Include muscle strengthening activities 2 days each week  Regular exercise provides many health benefits  It helps you manage your weight, and decreases your risk for type 2 diabetes, heart disease, stroke, and high blood pressure  Exercise can also help improve your mood  Ask your healthcare provider about the best exercise plan for you  General health and safety guidelines:   · Do not smoke  Nicotine and other chemicals in cigarettes and cigars can cause lung damage  Ask your healthcare provider for information if you currently smoke and need help to quit  E-cigarettes or smokeless tobacco still contain nicotine  Talk to your healthcare provider before you use these products  · Limit alcohol  A drink of alcohol is 12 ounces of beer, 5 ounces of wine, or 1½ ounces of liquor  · Lose weight, if needed  Being overweight increases your risk of certain health conditions  These include heart disease, high blood pressure, type 2 diabetes, and certain types of cancer  · Protect your skin  Do not sunbathe or use tanning beds  Use sunscreen with a SPF 15 or higher  Apply sunscreen at least 15 minutes before you go outside  Reapply sunscreen every 2 hours  Wear protective clothing, hats, and sunglasses when you are outside  · Drive safely  Always wear your seatbelt  Make sure everyone in your car wears a seatbelt  A seatbelt can save your life if you are in an accident  Do not use your cell phone when you are driving  This could distract you and cause an accident  Pull over if you need to make a call or send a text message  · Practice safe sex  Use latex condoms if are sexually active and have more than one partner  Your healthcare provider may recommend screening tests for sexually transmitted infections (STIs)  · Wear helmets, lifejackets, and protective gear  Always wear a helmet when you ride a bike or motorcycle, go skiing, or play sports that could cause a head injury   Wear protective equipment when you play sports  Wear a lifejacket when you are on a boat or doing water sports  © Copyright [employer] 2021 Information is for End User's use only and may not be sold, redistributed or otherwise used for commercial purposes  All illustrations and images included in CareNotes® are the copyrighted property of [employer]  or Jaye Grier   The above information is an  only  It is not intended as medical advice for individual conditions or treatments  Talk to your doctor, nurse or pharmacist before following any medical regimen to see if it is safe and effective for you

## 2021-09-13 NOTE — PROGRESS NOTES
ADULT ANNUAL [facility]    NAME: Belkis Larry  AGE: 32 y o  SEX: male  : 1995     DATE: 2021     Assessment and Plan:     Problem List Items Addressed This Visit     None      Visit Diagnoses     Annual physical exam    -  Primary    Groin pain, right        Relevant Orders    US scrotum and groin area    Pain in right testicle        Relevant Orders    US scrotum and groin area          Immunizations and preventive care screenings were discussed with patient today  Appropriate education was printed on patient's after visit summary  Counseling:  Alcohol/drug use: discussed moderation in alcohol intake, the recommendations for healthy alcohol use, and avoidance of illicit drug use  Dental Health: discussed importance of regular tooth brushing, flossing, and dental visits  Injury prevention: discussed safety/seat belts, safety helmets, smoke detectors, carbon dioxide detectors, and smoking near bedding or upholstery  Sexual health: discussed sexually transmitted diseases, partner selection, use of condoms, avoidance of unintended pregnancy, and contraceptive alternatives  · Exercise: the importance of regular exercise/physical activity was discussed  Recommend exercise 3-5 times per week for at least 30 minutes  BMI Counseling: Body mass index is 25 68 kg/m²  The BMI is above normal  Nutrition recommendations include decreasing portion sizes  Return if symptoms worsen or fail to improve-groin pain  Chief Complaint:     Chief Complaint   Patient presents with    Physical Exam     pt is not vaccinated for covid-19    Other     pt is here for pain on his Testicles mostly R side, pain on his R side ab area  History of Present Illness:     Adult Annual Physical   Patient here for a comprehensive physical exam  The patient reports problems - right groin/testicle pain      Diet and Physical Activity  · Diet/Nutrition: well balanced diet  · Exercise: no formal exercise  Depression Screening  PHQ-9 Depression Screening    PHQ-9:   Frequency of the following problems over the past two weeks:      Little interest or pleasure in doing things: 0 - not at all  Feeling down, depressed, or hopeless: 0 - not at all  PHQ-2 Score: 0       General Health  · Sleep: sleeps well  · Hearing: normal - bilateral   · Vision: goes for regular eye exams  · Dental: regular dental visits   Health  · History of STDs?: no      Review of Systems:     Review of Systems   Constitutional: Negative for activity change, appetite change, chills, diaphoresis, fatigue and fever  HENT: Negative for congestion, dental problem, ear pain, facial swelling, hearing loss, rhinorrhea, sinus pressure, sinus pain, sneezing, sore throat, tinnitus, trouble swallowing and voice change  Eyes: Negative for photophobia, pain, discharge, redness, itching and visual disturbance  Respiratory: Negative for cough, choking, chest tightness, shortness of breath, wheezing and stridor  Cardiovascular: Negative for chest pain, palpitations and leg swelling  Gastrointestinal: Negative for abdominal distention, abdominal pain, anal bleeding, blood in stool, constipation, diarrhea, nausea, rectal pain and vomiting  Endocrine: Negative for cold intolerance, heat intolerance, polydipsia, polyphagia and polyuria  Genitourinary: Positive for testicular pain  Negative for decreased urine volume, difficulty urinating, discharge, dysuria, flank pain, frequency, hematuria, penile pain, penile swelling, scrotal swelling and urgency  Musculoskeletal: Negative for arthralgias, back pain, gait problem, joint swelling, myalgias, neck pain and neck stiffness  Skin: Negative for color change, pallor, rash and wound     Neurological: Negative for dizziness, tremors, seizures, syncope, facial asymmetry, speech difficulty, weakness, light-headedness, numbness and headaches  Hematological: Negative for adenopathy  Does not bruise/bleed easily  Psychiatric/Behavioral: Negative for agitation, behavioral problems, confusion, decreased concentration, dysphoric mood, hallucinations, self-injury, sleep disturbance and suicidal ideas  The patient is not nervous/anxious and is not hyperactive  Past Medical History:     History reviewed  No pertinent past medical history  Past Surgical History:     Past Surgical History:   Procedure Laterality Date    WISDOM TOOTH EXTRACTION        Social History:     Social History     Socioeconomic History    Marital status: Single     Spouse name: None    Number of children: None    Years of education: None    Highest education level: None   Occupational History    None   Tobacco Use    Smoking status: Never Smoker    Smokeless tobacco: Never Used    Tobacco comment: Current some day smoker - As per [employer]   Vaping Use    Vaping Use: Never used   Substance and Sexual Activity    Alcohol use: Yes     Comment: 1-2 beer a day    Drug use: Not Currently    Sexual activity: Yes     Partners: Female   Other Topics Concern    None   Social History Narrative    None     Social Determinants of Health     Financial Resource Strain:     Difficulty of Paying Living Expenses:    Food Insecurity:     Worried About Running Out of Food in the Last Year:     Ran Out of Food in the Last Year:    Transportation Needs:     Lack of Transportation (Medical):      Lack of Transportation (Non-Medical):    Physical Activity:     Days of Exercise per Week:     Minutes of Exercise per Session:    Stress:     Feeling of Stress :    Social Connections:     Frequency of Communication with Friends and Family:     Frequency of Social Gatherings with Friends and Family:     Attends Muslim Services:     Active Member of Clubs or Organizations:     Attends Club or Organization Meetings:     Marital Status: Intimate Partner Violence:     Fear of Current or Ex-Partner:     Emotionally Abused:     Physically Abused:     Sexually Abused:       Family History:     Family History   Problem Relation Age of Onset    Hyperlipidemia Mother     Hyperlipidemia Father       Current Medications:     Current Outpatient Medications   Medication Sig Dispense Refill    cyclobenzaprine (FLEXERIL) 10 mg tablet Take 1 tablet (10 mg total) by mouth daily at bedtime (Patient not taking: Reported on 9/13/2021) 30 tablet 0    guaifenesin-codeine (GUAIFENESIN AC) 100-10 MG/5ML liquid Take 5 mL by mouth 3 (three) times a day as needed for cough (Patient not taking: Reported on 9/13/2021) 120 mL 0     No current facility-administered medications for this visit  Allergies: Allergies   Allergen Reactions    Amoxicillin       Physical Exam:     /80 (BP Location: Right arm, Patient Position: Sitting, Cuff Size: Adult)   Pulse 70   Temp 97 7 °F (36 5 °C)   Ht 5' 10" (1 778 m)   Wt 81 2 kg (179 lb)   SpO2 97%   BMI 25 68 kg/m²     Physical Exam  Vitals and nursing note reviewed  Constitutional:       General: He is not in acute distress  Appearance: Normal appearance  He is well-developed  He is not ill-appearing, toxic-appearing or diaphoretic  HENT:      Head: Normocephalic and atraumatic  Right Ear: Tympanic membrane, ear canal and external ear normal  There is no impacted cerumen  Left Ear: Tympanic membrane, ear canal and external ear normal  There is no impacted cerumen  Nose: Nose normal  No congestion  Mouth/Throat:      Mouth: Mucous membranes are moist       Pharynx: Oropharynx is clear  No oropharyngeal exudate or posterior oropharyngeal erythema  Eyes:      General:         Right eye: No discharge  Left eye: No discharge  Extraocular Movements: Extraocular movements intact  Conjunctiva/sclera: Conjunctivae normal    Neck:      Vascular: No carotid bruit  Cardiovascular:      Rate and Rhythm: Normal rate and regular rhythm  Pulses: Normal pulses  Heart sounds: Normal heart sounds  No murmur heard  No friction rub  No gallop  Pulmonary:      Effort: Pulmonary effort is normal  No respiratory distress  Breath sounds: Normal breath sounds  No stridor  No wheezing, rhonchi or rales  Chest:      Chest wall: No tenderness  Abdominal:      General: Bowel sounds are normal  There is no distension  Palpations: Abdomen is soft  Tenderness: There is no abdominal tenderness  There is no right CVA tenderness, left CVA tenderness or guarding  Hernia: No hernia is present  There is no hernia in the left inguinal area or right inguinal area  Genitourinary:     Penis: Normal and circumcised  Testes:         Right: Tenderness present  Mass or swelling not present  Right testis is descended  Cremasteric reflex is present  Left: Mass, tenderness or swelling not present  Epididymis:      Right: Normal       Left: Normal        Musculoskeletal:         General: No swelling or tenderness  Normal range of motion  Cervical back: Normal range of motion and neck supple  No rigidity or tenderness  Right lower leg: No edema  Left lower leg: No edema  Lymphadenopathy:      Cervical: No cervical adenopathy  Lower Body: No right inguinal adenopathy  No left inguinal adenopathy  Skin:     General: Skin is warm and dry  Coloration: Skin is not pale  Findings: No bruising, erythema or rash  Neurological:      General: No focal deficit present  Mental Status: He is alert and oriented to person, place, and time  Cranial Nerves: No cranial nerve deficit  Sensory: No sensory deficit  Motor: No weakness        Coordination: Coordination normal       Gait: Gait normal       Deep Tendon Reflexes: Reflexes normal    Psychiatric:         Mood and Affect: Mood normal          Behavior: Behavior normal          Thought Content:  Thought content normal          Judgment: Judgment normal         PHQ-9 Depression Screening    PHQ-9:   Frequency of the following problems over the past two weeks:      Little interest or pleasure in doing things: 0 - not at all  Feeling down, depressed, or hopeless: 0 - not at all  PHQ-2 Score: 0         Kera Bishop, [address] [facility]

## 2021-09-13 NOTE — PROGRESS NOTES
[address]  [facility]        NAME: Yolanda Peraza is a 32 y o  male  : 1995    MRN: [id number]  DATE: 2021  TIME: 1:23 PM    Assessment and Plan   Annual physical exam [F58 39]  1  Annual physical exam     2  Groin pain, right  US scrotum and groin area   3  Pain in right testicle  US scrotum and groin area         Patient Instructions     Patient Instructions     Ultrasound of groin/testicle as ordered  Wellness Visit for Adults   AMBULATORY CARE:   A wellness visit  is when you see your healthcare provider to get screened for health problems  Your healthcare provider will also give you advice on how to stay healthy  Write down your questions so you remember to ask them  Ask your healthcare provider how often you should have a wellness visit  What happens at a wellness visit:  Your healthcare provider will ask about your health, and your family history of health problems  This includes high blood pressure, heart disease, and cancer  He or she will ask if you have symptoms that concern you, if you smoke, and about your mood  You may also be asked about your intake of medicines, supplements, food, and alcohol  Any of the following may be done:  · Your weight  will be checked  Your height may also be checked so your body mass index (BMI) can be calculated  Your BMI shows if you are at a healthy weight  · Your blood pressure  and heart rate will be checked  Your temperature may also be checked  · Blood and urine tests  may be done  Blood tests may be done to check your cholesterol levels  Abnormal cholesterol levels increase your risk for heart disease and stroke  You may also need a blood or urine test to check for diabetes if you are at increased risk  Urine tests may be done to look for signs of an infection or kidney disease  · A physical exam  includes checking your heartbeat and lungs with a stethoscope   Your healthcare provider may also check your skin to look for sun damage  · Screening tests  may be recommended  A screening test is done to check for diseases that may not cause symptoms  The screening tests you may need depend on your age, gender, family history, and lifestyle habits  For example, colorectal screening may be recommended if you are 48years old or older  Screening tests you need if you are a woman:   · A Pap smear  is used to screen for cervical cancer  Pap smears are usually done every 3 to 5 years depending on your age  You may need them more often if you have had abnormal Pap smear test results in the past  Ask your healthcare provider how often you should have a Pap smear  · A mammogram  is an x-ray of your breasts to screen for breast cancer  Experts recommend mammograms every 2 years starting at age 48 years  You may need a mammogram at age 52 years or younger if you have an increased risk for breast cancer  Talk to your healthcare provider about when you should start having mammograms and how often you need them  Vaccines you may need:   · Get an influenza vaccine  every year  The influenza vaccine protects you from the flu  Several types of viruses cause the flu  The viruses change over time, so new vaccines are made each year  · Get a tetanus-diphtheria (Td) booster vaccine  every 10 years  This vaccine protects you against tetanus and diphtheria  Tetanus is a severe infection that may cause painful muscle spasms and lockjaw  Diphtheria is a severe bacterial infection that causes a thick covering in the back of your mouth and throat  · Get a human papillomavirus (HPV) vaccine  if you are female and aged 23 to 32 or male 23 to 24 and never received it  This vaccine protects you from HPV infection  HPV is the most common infection spread by sexual contact  HPV may also cause vaginal, penile, and anal cancers  · Get a pneumococcal vaccine  if you are aged 72 years or older   The pneumococcal vaccine is an injection given to protect you from pneumococcal disease  Pneumococcal disease is an infection caused by pneumococcal bacteria  The infection may cause pneumonia, meningitis, or an ear infection  · Get a shingles vaccine  if you are 60 or older, even if you have had shingles before  The shingles vaccine is an injection to protect you from the varicella-zoster virus  This is the same virus that causes chickenpox  Shingles is a painful rash that develops in people who had chickenpox or have been exposed to the virus  How to eat healthy:  My Plate is a model for planning healthy meals  It shows the types and amounts of foods that should go on your plate  Fruits and vegetables make up about half of your plate, and grains and protein make up the other half  A serving of dairy is included on the side of your plate  The amount of calories and serving sizes you need depends on your age, gender, weight, and height  Examples of healthy foods are listed below:  · Eat a variety of vegetables  such as dark green, red, and orange vegetables  You can also include canned vegetables low in sodium (salt) and frozen vegetables without added butter or sauces  · Eat a variety of fresh fruits , canned fruit in 100% juice, frozen fruit, and dried fruit  · Include whole grains  At least half of the grains you eat should be whole grains  Examples include whole-wheat bread, wheat pasta, brown rice, and whole-grain cereals such as oatmeal     · Eat a variety of protein foods such as seafood (fish and shellfish), lean meat, and poultry without skin (turkey and chicken)  Examples of lean meats include pork leg, shoulder, or tenderloin, and beef round, sirloin, tenderloin, and extra lean ground beef  Other protein foods include eggs and egg substitutes, beans, peas, soy products, nuts, and seeds  · Choose low-fat dairy products such as skim or 1% milk or low-fat yogurt, cheese, and cottage cheese      · Limit unhealthy fats  such as butter, hard margarine, and shortening  Exercise:  Exercise at least 30 minutes per day on most days of the week  Some examples of exercise include walking, biking, dancing, and swimming  You can also fit in more physical activity by taking the stairs instead of the elevator or parking farther away from stores  Include muscle strengthening activities 2 days each week  Regular exercise provides many health benefits  It helps you manage your weight, and decreases your risk for type 2 diabetes, heart disease, stroke, and high blood pressure  Exercise can also help improve your mood  Ask your healthcare provider about the best exercise plan for you  General health and safety guidelines:   · Do not smoke  Nicotine and other chemicals in cigarettes and cigars can cause lung damage  Ask your healthcare provider for information if you currently smoke and need help to quit  E-cigarettes or smokeless tobacco still contain nicotine  Talk to your healthcare provider before you use these products  · Limit alcohol  A drink of alcohol is 12 ounces of beer, 5 ounces of wine, or 1½ ounces of liquor  · Lose weight, if needed  Being overweight increases your risk of certain health conditions  These include heart disease, high blood pressure, type 2 diabetes, and certain types of cancer  · Protect your skin  Do not sunbathe or use tanning beds  Use sunscreen with a SPF 15 or higher  Apply sunscreen at least 15 minutes before you go outside  Reapply sunscreen every 2 hours  Wear protective clothing, hats, and sunglasses when you are outside  · Drive safely  Always wear your seatbelt  Make sure everyone in your car wears a seatbelt  A seatbelt can save your life if you are in an accident  Do not use your cell phone when you are driving  This could distract you and cause an accident  Pull over if you need to make a call or send a text message  · Practice safe sex    Use latex condoms if are sexually active and have more than one partner  Your healthcare provider may recommend screening tests for sexually transmitted infections (STIs)  · Wear helmets, lifejackets, and protective gear  Always wear a helmet when you ride a bike or motorcycle, go skiing, or play sports that could cause a head injury  Wear protective equipment when you play sports  Wear a lifejacket when you are on a boat or doing water sports  © Copyright [employer] 2021 Information is for End User's use only and may not be sold, redistributed or otherwise used for commercial purposes  All illustrations and images included in CareNotes® are the copyrighted property of [employer] Inc  or [employer]   The above information is an  only  It is not intended as medical advice for individual conditions or treatments  Talk to your doctor, nurse or pharmacist before following any medical regimen to see if it is safe and effective for you  Chief Complaint     Chief Complaint   Patient presents with    Physical Exam     pt is not vaccinated for covid-19    Other     pt is here for pain on his Testicles mostly R side, pain on his R side ab area  History of Present Illness       C/o right testicle pain and groin pain x 1 month  Pain is increased with activity (lifting, walking)  Denies penile pain, no discharge from penis, no swelling or redness  Review of Systems   Review of Systems   Constitutional: Negative for activity change and fever  Respiratory: Negative for chest tightness  Cardiovascular: Negative for chest pain and leg swelling  Gastrointestinal: Negative for abdominal distention and abdominal pain  Genitourinary: Positive for testicular pain  Negative for difficulty urinating, discharge, dysuria, flank pain, frequency, genital sores, hematuria, penile pain, penile swelling, scrotal swelling and urgency  Musculoskeletal: Negative for myalgias  Skin: Negative for color change and rash     Neurological: Negative for weakness  Psychiatric/Behavioral: Negative for dysphoric mood  Current Medications       Current Outpatient Medications:     cyclobenzaprine (FLEXERIL) 10 mg tablet, Take 1 tablet (10 mg total) by mouth daily at bedtime (Patient not taking: Reported on 9/13/2021), Disp: 30 tablet, Rfl: 0    guaifenesin-codeine (GUAIFENESIN AC) 100-10 MG/5ML liquid, Take 5 mL by mouth 3 (three) times a day as needed for cough (Patient not taking: Reported on 9/13/2021), Disp: 120 mL, Rfl: 0    Current Allergies     Allergies as of 09/13/2021 - Reviewed 09/13/2021   Allergen Reaction Noted    Amoxicillin  06/09/2016            The following portions of the patient's history were reviewed and updated as appropriate: allergies, current medications, past family history, past medical history, past social history, past surgical history and problem list      History reviewed  No pertinent past medical history  Past Surgical History:   Procedure Laterality Date    WISDOM TOOTH EXTRACTION         Family History   Problem Relation Age of Onset    Hyperlipidemia Mother     Hyperlipidemia Father          Medications have been verified  Objective   /80 (BP Location: Right arm, Patient Position: Sitting, Cuff Size: Adult)   Pulse 70   Temp 97 7 °F (36 5 °C)   Ht 5' 10" (1 778 m)   Wt 81 2 kg (179 lb)   SpO2 97%   BMI 25 68 kg/m²        Physical Exam     Physical Exam  Vitals and nursing note reviewed  Constitutional:       General: He is not in acute distress  Appearance: Normal appearance  He is well-developed  He is not ill-appearing or diaphoretic  HENT:      Head: Normocephalic  Neck:      Thyroid: No thyromegaly  Trachea: No tracheal deviation  Cardiovascular:      Rate and Rhythm: Normal rate and regular rhythm  Heart sounds: Normal heart sounds  No murmur heard  No friction rub  No gallop      Pulmonary:      Effort: Pulmonary effort is normal  No respiratory distress  Breath sounds: Normal breath sounds  No wheezing  Abdominal:      General: Abdomen is flat  Bowel sounds are normal  There is no distension  Palpations: Abdomen is soft  Tenderness: There is no abdominal tenderness  There is no right CVA tenderness or left CVA tenderness  Hernia: No hernia is present  There is no hernia in the left inguinal area, right femoral area, left femoral area or right inguinal area  Genitourinary:     Pubic Area: No rash  Penis: Normal        Testes:         Right: Tenderness present  Mass or swelling not present  Right testis is descended  Cremasteric reflex is present  Musculoskeletal:         General: No swelling, tenderness or deformity  Normal range of motion  Cervical back: Normal range of motion and neck supple  No rigidity or tenderness  Lymphadenopathy:      Cervical: No cervical adenopathy  Lower Body: No right inguinal adenopathy  No left inguinal adenopathy  Skin:     General: Skin is warm and dry  Coloration: Skin is not pale  Findings: No erythema or rash  Neurological:      Mental Status: He is alert and oriented to person, place, and time  Psychiatric:         Mood and Affect: Mood normal          Speech: Speech normal          Behavior: Behavior normal          Thought Content:  Thought content normal          Judgment: Judgment normal            PHQ-9 Depression Screening    PHQ-9:   Frequency of the following problems over the past two weeks:      Little interest or pleasure in doing things: 0 - not at all  Feeling down, depressed, or hopeless: 0 - not at all  PHQ-2 Score: 0

## 2021-09-14 ENCOUNTER — HOSPITAL ENCOUNTER (OUTPATIENT)
Dept: RADIOLOGY | Facility: HOSPITAL | Age: 26
Discharge: HOME | End: 2021-09-14
Payer: COMMERCIAL

## 2021-09-14 DIAGNOSIS — N50.811 PAIN IN RIGHT TESTICLE: ICD-10-CM

## 2021-09-14 DIAGNOSIS — R10.31 GROIN PAIN, RIGHT: ICD-10-CM

## 2021-09-14 PROCEDURE — 76870 US EXAM SCROTUM: CPT

## 2021-09-22 ENCOUNTER — TELEPHONE (OUTPATIENT)
Dept: FAMILY MEDICINE CLINIC | Facility: CLINIC | Age: 26
End: 2021-09-22

## 2021-09-22 NOTE — TELEPHONE ENCOUNTER
----- Message from [address] [place] [address],[address] sent at 9/20/2021  1:01 PM EDT -----  Call pt   Ultrasounds of scrotum and groin is normal

## 2022-07-06 ENCOUNTER — OFFICE VISIT (OUTPATIENT)
Dept: FAMILY MEDICINE CLINIC | Facility: CLINIC | Age: 27
End: 2022-07-06
Payer: COMMERCIAL

## 2022-07-06 VITALS
HEIGHT: 70 IN | DIASTOLIC BLOOD PRESSURE: 88 MMHG | BODY MASS INDEX: 25.62 KG/M2 | SYSTOLIC BLOOD PRESSURE: 138 MMHG | OXYGEN SATURATION: 99 % | HEART RATE: 72 BPM | WEIGHT: 179 LBS

## 2022-07-06 DIAGNOSIS — R55 VASOVAGAL SYNCOPE: Primary | ICD-10-CM

## 2022-07-06 PROCEDURE — 99214 OFFICE O/P EST MOD 30 MIN: CPT | Performed by: FAMILY MEDICINE

## 2022-07-06 NOTE — PROGRESS NOTES
Assessment/Plan:    No problem-specific Assessment & Plan notes found for this encounter  Diagnoses and all orders for this visit:    Vasovagal syncope          Subjective:   Chief Complaint   Patient presents with    Follow-up     Passed out Sat 7/2/22 1AM after using 2 5gms Magic Mushrooms, 4+beers, marijuana  Friends started CPR on him and he thinks they broke his sternum        Patient ID: Argelia Bateman is a 32 y o  male  HPI    The following portions of the patient's history were reviewed and updated as appropriate: allergies, current medications, past family history, past medical history, past social history, past surgical history and problem list     Review of Systems   Constitutional: Negative for fatigue, fever and unexpected weight change  HENT: Negative for congestion, sinus pain and sore throat  Eyes: Negative for visual disturbance  Respiratory: Negative for shortness of breath and wheezing  Cardiovascular: Negative for chest pain and palpitations  Gastrointestinal: Negative for abdominal pain, nausea and vomiting  Musculoskeletal: Negative  Negative for arthralgias and myalgias  Neurological: Negative for syncope, weakness and numbness  Psychiatric/Behavioral: Negative  Negative for confusion, dysphoric mood and suicidal ideas  Objective:  Vitals:    07/06/22 1504   BP: 138/88   BP Location: Right arm   Patient Position: Sitting   Cuff Size: Standard   Pulse: 72   SpO2: 99%   Weight: 81 2 kg (179 lb)   Height: 5' 10" (1 778 m)      Physical Exam  Constitutional:       Appearance: He is well-developed  HENT:      Right Ear: Ear canal normal  Tympanic membrane is not injected  Left Ear: Ear canal normal  Tympanic membrane is not injected  Nose: Nose normal    Eyes:      General:         Right eye: No discharge  Left eye: No discharge  Conjunctiva/sclera: Conjunctivae normal       Pupils: Pupils are equal, round, and reactive to light  Neck:      Thyroid: No thyromegaly  Cardiovascular:      Rate and Rhythm: Normal rate and regular rhythm  Heart sounds: Normal heart sounds  No murmur heard  Pulmonary:      Effort: Pulmonary effort is normal  No respiratory distress  Breath sounds: Normal breath sounds  No wheezing  Abdominal:      General: Bowel sounds are normal  There is no distension  Palpations: Abdomen is soft  Tenderness: There is no abdominal tenderness  Musculoskeletal:         General: Normal range of motion  Cervical back: Normal range of motion and neck supple  Lymphadenopathy:      Cervical: No cervical adenopathy  Skin:     General: Skin is warm and dry  Neurological:      Mental Status: He is alert and oriented to person, place, and time  He is not disoriented  Sensory: No sensory deficit  Gait: Gait normal       Deep Tendon Reflexes: Reflexes are normal and symmetric  Psychiatric:         Speech: Speech normal          Behavior: Behavior normal          Thought Content:  Thought content normal          Judgment: Judgment normal

## 2022-07-07 DIAGNOSIS — D22.9 NEVUS: Primary | ICD-10-CM

## 2022-11-19 ENCOUNTER — OFFICE VISIT (OUTPATIENT)
Dept: FAMILY MEDICINE CLINIC | Facility: CLINIC | Age: 27
End: 2022-11-19

## 2022-11-19 VITALS
DIASTOLIC BLOOD PRESSURE: 66 MMHG | OXYGEN SATURATION: 99 % | RESPIRATION RATE: 18 BRPM | HEIGHT: 71 IN | TEMPERATURE: 97 F | HEART RATE: 74 BPM | SYSTOLIC BLOOD PRESSURE: 119 MMHG | BODY MASS INDEX: 25.34 KG/M2 | WEIGHT: 181 LBS

## 2022-11-19 DIAGNOSIS — Z00.00 ANNUAL PHYSICAL EXAM: Primary | ICD-10-CM

## 2022-11-19 DIAGNOSIS — M54.50 ACUTE MIDLINE LOW BACK PAIN WITHOUT SCIATICA: ICD-10-CM

## 2022-11-19 DIAGNOSIS — Z23 ENCOUNTER FOR IMMUNIZATION: ICD-10-CM

## 2022-11-19 DIAGNOSIS — K12.1 MOUTH ULCERS: ICD-10-CM

## 2022-11-19 RX ORDER — IBUPROFEN 600 MG/1
600 TABLET ORAL EVERY 6 HOURS PRN
Qty: 30 TABLET | Refills: 1 | Status: SHIPPED | OUTPATIENT
Start: 2022-11-19

## 2022-11-19 NOTE — PATIENT INSTRUCTIONS
Mouth wash as ordered for sores  Updated Tetanus given today  Call with any problems/concerns  Wellness Visit for Adults   AMBULATORY CARE:   A wellness visit  is when you see your healthcare provider to get screened for health problems  Your healthcare provider will also give you advice on how to stay healthy  Write down your questions so you remember to ask them  Ask your healthcare provider how often you should have a wellness visit  What happens at a wellness visit:  Your healthcare provider will ask about your health, and your family history of health problems  This includes high blood pressure, heart disease, and cancer  He or she will ask if you have symptoms that concern you, if you smoke, and about your mood  You may also be asked about your intake of medicines, supplements, food, and alcohol  Any of the following may be done: Your weight  will be checked  Your height may also be checked so your body mass index (BMI) can be calculated  Your BMI shows if you are at a healthy weight  Your blood pressure  and heart rate will be checked  Your temperature may also be checked  Blood and urine tests  may be done  Blood tests may be done to check your cholesterol levels  Abnormal cholesterol levels increase your risk for heart disease and stroke  You may also need a blood or urine test to check for diabetes if you are at increased risk  Urine tests may be done to look for signs of an infection or kidney disease  A physical exam  includes checking your heartbeat and lungs with a stethoscope  Your healthcare provider may also check your skin to look for sun damage  Screening tests  may be recommended  A screening test is done to check for diseases that may not cause symptoms  The screening tests you may need depend on your age, gender, family history, and lifestyle habits  For example, colorectal screening may be recommended if you are 48years old or older      Screening tests you need if you are a woman:   A Pap smear  is used to screen for cervical cancer  Pap smears are usually done every 3 to 5 years depending on your age  You may need them more often if you have had abnormal Pap smear test results in the past  Ask your healthcare provider how often you should have a Pap smear  A mammogram  is an x-ray of your breasts to screen for breast cancer  Experts recommend mammograms every 2 years starting at age 48 years  You may need a mammogram at age 52 years or younger if you have an increased risk for breast cancer  Talk to your healthcare provider about when you should start having mammograms and how often you need them  Vaccines you may need:   Get an influenza vaccine  every year  The influenza vaccine protects you from the flu  Several types of viruses cause the flu  The viruses change over time, so new vaccines are made each year  Get a tetanus-diphtheria (Td) booster vaccine  every 10 years  This vaccine protects you against tetanus and diphtheria  Tetanus is a severe infection that may cause painful muscle spasms and lockjaw  Diphtheria is a severe bacterial infection that causes a thick covering in the back of your mouth and throat  Get a human papillomavirus (HPV) vaccine  if you are female and aged 23 to 32 or male 23 to 24 and never received it  This vaccine protects you from HPV infection  HPV is the most common infection spread by sexual contact  HPV may also cause vaginal, penile, and anal cancers  Get a pneumococcal vaccine  if you are aged 72 years or older  The pneumococcal vaccine is an injection given to protect you from pneumococcal disease  Pneumococcal disease is an infection caused by pneumococcal bacteria  The infection may cause pneumonia, meningitis, or an ear infection  Get a shingles vaccine  if you are 60 or older, even if you have had shingles before  The shingles vaccine is an injection to protect you from the varicella-zoster virus   This is the same virus that causes chickenpox  Shingles is a painful rash that develops in people who had chickenpox or have been exposed to the virus  How to eat healthy:  My Plate is a model for planning healthy meals  It shows the types and amounts of foods that should go on your plate  Fruits and vegetables make up about half of your plate, and grains and protein make up the other half  A serving of dairy is included on the side of your plate  The amount of calories and serving sizes you need depends on your age, gender, weight, and height  Examples of healthy foods are listed below:  Eat a variety of vegetables  such as dark green, red, and orange vegetables  You can also include canned vegetables low in sodium (salt) and frozen vegetables without added butter or sauces  Eat a variety of fresh fruits , canned fruit in 100% juice, frozen fruit, and dried fruit  Include whole grains  At least half of the grains you eat should be whole grains  Examples include whole-wheat bread, wheat pasta, brown rice, and whole-grain cereals such as oatmeal     Eat a variety of protein foods such as seafood (fish and shellfish), lean meat, and poultry without skin (turkey and chicken)  Examples of lean meats include pork leg, shoulder, or tenderloin, and beef round, sirloin, tenderloin, and extra lean ground beef  Other protein foods include eggs and egg substitutes, beans, peas, soy products, nuts, and seeds  Choose low-fat dairy products such as skim or 1% milk or low-fat yogurt, cheese, and cottage cheese  Limit unhealthy fats  such as butter, hard margarine, and shortening  Exercise:  Exercise at least 30 minutes per day on most days of the week  Some examples of exercise include walking, biking, dancing, and swimming  You can also fit in more physical activity by taking the stairs instead of the elevator or parking farther away from stores  Include muscle strengthening activities 2 days each week   Regular exercise provides many health benefits  It helps you manage your weight, and decreases your risk for type 2 diabetes, heart disease, stroke, and high blood pressure  Exercise can also help improve your mood  Ask your healthcare provider about the best exercise plan for you  General health and safety guidelines:   Do not smoke  Nicotine and other chemicals in cigarettes and cigars can cause lung damage  Ask your healthcare provider for information if you currently smoke and need help to quit  E-cigarettes or smokeless tobacco still contain nicotine  Talk to your healthcare provider before you use these products  Limit alcohol  A drink of alcohol is 12 ounces of beer, 5 ounces of wine, or 1½ ounces of liquor  Lose weight, if needed  Being overweight increases your risk of certain health conditions  These include heart disease, high blood pressure, type 2 diabetes, and certain types of cancer  Protect your skin  Do not sunbathe or use tanning beds  Use sunscreen with a SPF 15 or higher  Apply sunscreen at least 15 minutes before you go outside  Reapply sunscreen every 2 hours  Wear protective clothing, hats, and sunglasses when you are outside  Drive safely  Always wear your seatbelt  Make sure everyone in your car wears a seatbelt  A seatbelt can save your life if you are in an accident  Do not use your cell phone when you are driving  This could distract you and cause an accident  Pull over if you need to make a call or send a text message  Practice safe sex  Use latex condoms if are sexually active and have more than one partner  Your healthcare provider may recommend screening tests for sexually transmitted infections (STIs)  Wear helmets, lifejackets, and protective gear  Always wear a helmet when you ride a bike or motorcycle, go skiing, or play sports that could cause a head injury  Wear protective equipment when you play sports  Wear a lifejacket when you are on a boat or doing water sports      © Copyright [employer] 2022 Information is for End User's use only and may not be sold, redistributed or otherwise used for commercial purposes  All illustrations and images included in CareNotes® are the copyrighted property of A D A M [employer] Inc  or Jaye Saini  The above information is an  only  It is not intended as medical advice for individual conditions or treatments  Talk to your doctor, nurse or pharmacist before following any medical regimen to see if it is safe and effective for you

## 2022-11-19 NOTE — PROGRESS NOTES
ADULT ANNUAL [facility]    NAME: Anabela Bauman  AGE: 32 y o  SEX: male  : 1995     DATE: 2022     Assessment and Plan:     Problem List Items Addressed This Visit    None  Visit Diagnoses     Annual physical exam    -  Primary    Encounter for immunization        Relevant Orders    TDAP VACCINE GREATER THAN OR EQUAL TO 6YO IM (Completed)    Mouth ulcers        Relevant Medications    al mag oxide-diphenhydramine-lidocaine viscous (MAGIC MOUTHWASH) 1:1:1 suspension    Acute midline low back pain without sciatica        Relevant Medications    ibuprofen (MOTRIN) 600 mg tablet          Immunizations and preventive care screenings were discussed with patient today  Appropriate education was printed on patient's after visit summary  Counseling:  Alcohol/drug use: discussed moderation in alcohol intake, the recommendations for healthy alcohol use, and avoidance of illicit drug use  Dental Health: discussed importance of regular tooth brushing, flossing, and dental visits  Injury prevention: discussed safety/seat belts, safety helmets, smoke detectors, carbon dioxide detectors, and smoking near bedding or upholstery  Sexual health: discussed sexually transmitted diseases, partner selection, use of condoms, avoidance of unintended pregnancy, and contraceptive alternatives  · Exercise: the importance of regular exercise/physical activity was discussed  Recommend exercise 3-5 times per week for at least 30 minutes  BMI Counseling: Body mass index is 25 48 kg/m²  The BMI is above normal  Nutrition recommendations include decreasing portion sizes and encouraging healthy choices of fruits and vegetables  Exercise recommendations include exercising 3-5 times per week  Rationale for BMI follow-up plan is due to patient being overweight or obese       Depression Screening and Follow-up Plan: Patient was screened for depression during today's encounter  They screened negative with a PHQ-2 score of 0  Return in about 1 year (around 11/19/2023) for Annual physical      Chief Complaint:     Chief Complaint   Patient presents with   • Physical Exam     Mouth ulcers, check nose      History of Present Illness:     Adult Annual Physical   Patient here for a comprehensive physical exam  The patient reports mouth sore  Occasional back pain-requesting ibuprofen script-cheaper with prescription  Diet and Physical Activity  · Diet/Nutrition: well balanced diet  · Exercise: no formal exercise  Depression Screening  PHQ-2/9 Depression Screening    Little interest or pleasure in doing things: 0 - not at all  Feeling down, depressed, or hopeless: 0 - not at all  PHQ-2 Score: 0  PHQ-2 Interpretation: Negative depression screen       General Health  · Sleep: sleeps well  · Hearing: normal - bilateral   · Vision: no vision problems and wears glasses  · Dental: regular dental visits  [facility]  · History of STDs?: no      Review of Systems:     Review of Systems   Constitutional: Negative for activity change, appetite change, chills, diaphoresis, fatigue and fever  HENT: Positive for mouth sores  Negative for congestion, dental problem, drooling, ear discharge, ear pain, facial swelling, hearing loss, nosebleeds, postnasal drip, rhinorrhea, sinus pressure, sinus pain, sneezing, sore throat, tinnitus, trouble swallowing and voice change  Eyes: Negative for photophobia, pain, discharge, redness, itching and visual disturbance  Respiratory: Negative for cough, choking, chest tightness, shortness of breath, wheezing and stridor  Cardiovascular: Negative for chest pain, palpitations and leg swelling  Gastrointestinal: Negative for abdominal distention, abdominal pain, anal bleeding, blood in stool, constipation, diarrhea, nausea, rectal pain and vomiting     Endocrine: Negative for cold intolerance, heat intolerance, polydipsia, polyphagia and polyuria  Genitourinary: Negative for decreased urine volume, difficulty urinating, dysuria, enuresis, flank pain, frequency, genital sores, hematuria and urgency  Musculoskeletal: Positive for back pain and myalgias  Negative for arthralgias, gait problem, joint swelling, neck pain and neck stiffness  Skin: Negative for color change, pallor, rash and wound  Neurological: Negative for dizziness, tremors, seizures, syncope, facial asymmetry, speech difficulty, weakness, light-headedness, numbness and headaches  Hematological: Negative for adenopathy  Does not bruise/bleed easily  Psychiatric/Behavioral: Negative for agitation, behavioral problems, confusion, decreased concentration, dysphoric mood, hallucinations, self-injury, sleep disturbance and suicidal ideas  The patient is not nervous/anxious and is not hyperactive  Past Medical History:     History reviewed  No pertinent past medical history  Past Surgical History:     Past Surgical History:   Procedure Laterality Date   • WISDOM TOOTH EXTRACTION        Social History:     Social History     Socioeconomic History   • Marital status: Single     Spouse name: None   • Number of children: None   • Years of education: None   • Highest education level: None   Occupational History   • None   Tobacco Use   • Smoking status: Never   • Smokeless tobacco: Never   • Tobacco comments:     Current some day smoker - As per [employer]   Vaping Use   • Vaping Use: Never used   Substance and Sexual Activity   • Alcohol use:  Yes     Alcohol/week: 20 0 standard drinks     Types: 20 Cans of beer per week     Comment: 1-2 beer a day   • Drug use: Yes     Types: Marijuana   • Sexual activity: Yes     Partners: Female   Other Topics Concern   • None   Social History Narrative   • None     Social Determinants of Health     Financial Resource Strain: Not on file   Food Insecurity: Not on file   Transportation Needs: Not on file   Physical Activity: Not on file   Stress: Not on file   Social Connections: Not on file   Intimate Partner Violence: Not on file   Housing Stability: Not on file      Family History:     Family History   Problem Relation Age of Onset   • Hyperlipidemia Mother    • Hyperlipidemia Father       Current Medications:     Current Outpatient Medications   Medication Sig Dispense Refill   • al mag oxide-diphenhydramine-lidocaine viscous (MAGIC MOUTHWASH) 1:1:1 suspension Swish and spit 10 mL every 4 (four) hours as needed for mouth pain or discomfort 90 mL 0   • ibuprofen (MOTRIN) 600 mg tablet Take 1 tablet (600 mg total) by mouth every 6 (six) hours as needed for mild pain or moderate pain 30 tablet 1     No current facility-administered medications for this visit  Allergies: Allergies   Allergen Reactions   • Amoxicillin       Physical Exam:     /66 (BP Location: Left arm, Patient Position: Sitting, Cuff Size: Adult)   Pulse 74   Temp (!) 97 °F (36 1 °C) (Tympanic)   Resp 18   Ht 5' 10 67" (1 795 m)   Wt 82 1 kg (181 lb)   SpO2 99%   BMI 25 48 kg/m²     Physical Exam  Vitals and nursing note reviewed  Constitutional:       General: He is not in acute distress  Appearance: Normal appearance  He is well-developed and normal weight  He is not ill-appearing, toxic-appearing or diaphoretic  HENT:      Head: Normocephalic and atraumatic  Right Ear: Tympanic membrane, ear canal and external ear normal  There is no impacted cerumen  Left Ear: Tympanic membrane, ear canal and external ear normal  There is no impacted cerumen  Nose: Nose normal  No congestion or rhinorrhea  Mouth/Throat:      Mouth: Mucous membranes are moist       Pharynx: Oropharynx is clear  No oropharyngeal exudate or posterior oropharyngeal erythema  Comments: Mouth sore present lower gum  Eyes:      General:         Right eye: No discharge  Left eye: No discharge        Extraocular Movements: Extraocular movements intact  Conjunctiva/sclera: Conjunctivae normal       Pupils: Pupils are equal, round, and reactive to light  Neck:      Vascular: No carotid bruit  Cardiovascular:      Rate and Rhythm: Normal rate and regular rhythm  Pulses: Normal pulses  Heart sounds: Normal heart sounds  No murmur heard  No friction rub  No gallop  Pulmonary:      Effort: Pulmonary effort is normal  No respiratory distress  Breath sounds: Normal breath sounds  No stridor  No wheezing or rhonchi  Chest:      Chest wall: No tenderness  Abdominal:      General: Bowel sounds are normal  There is no distension  Palpations: Abdomen is soft  There is no mass  Tenderness: There is no abdominal tenderness  There is no right CVA tenderness, left CVA tenderness, guarding or rebound  Hernia: No hernia is present  Musculoskeletal:         General: Tenderness present  No swelling, deformity or signs of injury  Normal range of motion  Cervical back: Normal range of motion and neck supple  No rigidity or tenderness  Right lower leg: No edema  Left lower leg: No edema  Lymphadenopathy:      Cervical: No cervical adenopathy  Skin:     General: Skin is warm and dry  Capillary Refill: Capillary refill takes less than 2 seconds  Coloration: Skin is not jaundiced or pale  Findings: No bruising, erythema, lesion or rash  Neurological:      General: No focal deficit present  Mental Status: He is alert and oriented to person, place, and time  Cranial Nerves: No cranial nerve deficit  Sensory: No sensory deficit  Motor: No weakness  Coordination: Coordination normal       Gait: Gait normal       Deep Tendon Reflexes: Reflexes normal    Psychiatric:         Mood and Affect: Mood normal          Behavior: Behavior normal          Thought Content:  Thought content normal          Judgment: Judgment normal         PHQ-2/9 Depression Screening    Little interest or pleasure in doing things: 0 - not at all  Feeling down, depressed, or hopeless: 0 - not at all  PHQ-2 Score: 0  PHQ-2 Interpretation: Negative depression screen           Kera Diamond, [address] [facility]